# Patient Record
Sex: FEMALE | Race: WHITE | URBAN - METROPOLITAN AREA
[De-identification: names, ages, dates, MRNs, and addresses within clinical notes are randomized per-mention and may not be internally consistent; named-entity substitution may affect disease eponyms.]

---

## 2021-08-03 LAB — GBS, EXTERNAL RESULT: NEGATIVE

## 2021-09-03 ENCOUNTER — ANESTHESIA (OUTPATIENT)
Dept: LABOR AND DELIVERY | Age: 31
End: 2021-09-03
Payer: COMMERCIAL

## 2021-09-03 ENCOUNTER — HOSPITAL ENCOUNTER (INPATIENT)
Age: 31
LOS: 2 days | Discharge: HOME | End: 2021-09-05
Attending: OBSTETRICS & GYNECOLOGY | Admitting: OBSTETRICS & GYNECOLOGY
Payer: COMMERCIAL

## 2021-09-03 ENCOUNTER — ANESTHESIA EVENT (OUTPATIENT)
Dept: LABOR AND DELIVERY | Age: 31
End: 2021-09-03
Payer: COMMERCIAL

## 2021-09-03 VITALS
RESPIRATION RATE: 1 BRPM | SYSTOLIC BLOOD PRESSURE: 123 MMHG | OXYGEN SATURATION: 96 % | DIASTOLIC BLOOD PRESSURE: 64 MMHG

## 2021-09-03 PROBLEM — O36.60X0 LGA (LARGE FOR GESTATIONAL AGE) FETUS AFFECTING MANAGEMENT OF MOTHER: Status: ACTIVE | Noted: 2021-09-03

## 2021-09-03 PROBLEM — Z87.59 HISTORY OF ONE MISCARRIAGE: Status: ACTIVE | Noted: 2021-09-03

## 2021-09-03 PROBLEM — Z34.00 SUPERVISION OF NORMAL FIRST PREGNANCY: Status: ACTIVE | Noted: 2021-09-03

## 2021-09-03 LAB
ABO/RH: NORMAL
AMPHETAMINE SCREEN, URINE: NORMAL
ANTIBODY SCREEN: NORMAL
APTT: 30.4 SEC (ref 26.2–38.6)
BARBITURATE SCREEN URINE: NORMAL
BENZODIAZEPINE SCREEN, URINE: NORMAL
BUPRENORPHINE URINE: NORMAL
CANNABINOID SCREEN URINE: NORMAL
COCAINE METABOLITE SCREEN URINE: NORMAL
HCT VFR BLD CALC: 38.7 % (ref 36–48)
HEMOGLOBIN: 13.3 G/DL (ref 12–16)
INR BLD: 0.94 (ref 0.88–1.12)
Lab: NORMAL
MCH RBC QN AUTO: 31.9 PG (ref 26–34)
MCHC RBC AUTO-ENTMCNC: 34.4 G/DL (ref 31–36)
MCV RBC AUTO: 92.7 FL (ref 80–100)
METHADONE SCREEN, URINE: NORMAL
OPIATE SCREEN URINE: NORMAL
OXYCODONE URINE: NORMAL
PDW BLD-RTO: 14 % (ref 12.4–15.4)
PH UA: 6
PHENCYCLIDINE SCREEN URINE: NORMAL
PLATELET # BLD: 88 K/UL (ref 135–450)
PLATELET # BLD: NORMAL K/UL (ref 135–450)
PLATELET SLIDE REVIEW: ABNORMAL
PMV BLD AUTO: 10.8 FL (ref 5–10.5)
PROPOXYPHENE SCREEN: NORMAL
PROTHROMBIN TIME: 10.6 SEC (ref 9.9–12.7)
RBC # BLD: 4.18 M/UL (ref 4–5.2)
SARS-COV-2, NAAT: NOT DETECTED
SLIDE REVIEW: ABNORMAL
TOTAL SYPHILLIS IGG/IGM: NORMAL
WBC # BLD: 15.4 K/UL (ref 4–11)

## 2021-09-03 PROCEDURE — 86780 TREPONEMA PALLIDUM: CPT

## 2021-09-03 PROCEDURE — 6360000002 HC RX W HCPCS: Performed by: NURSE ANESTHETIST, CERTIFIED REGISTERED

## 2021-09-03 PROCEDURE — 2500000003 HC RX 250 WO HCPCS: Performed by: OBSTETRICS & GYNECOLOGY

## 2021-09-03 PROCEDURE — 7100000000 HC PACU RECOVERY - FIRST 15 MIN: Performed by: OBSTETRICS & GYNECOLOGY

## 2021-09-03 PROCEDURE — 3700000001 HC ADD 15 MINUTES (ANESTHESIA): Performed by: OBSTETRICS & GYNECOLOGY

## 2021-09-03 PROCEDURE — 85730 THROMBOPLASTIN TIME PARTIAL: CPT

## 2021-09-03 PROCEDURE — 7100000001 HC PACU RECOVERY - ADDTL 15 MIN: Performed by: OBSTETRICS & GYNECOLOGY

## 2021-09-03 PROCEDURE — 6360000002 HC RX W HCPCS: Performed by: OBSTETRICS & GYNECOLOGY

## 2021-09-03 PROCEDURE — 3700000000 HC ANESTHESIA ATTENDED CARE: Performed by: OBSTETRICS & GYNECOLOGY

## 2021-09-03 PROCEDURE — 2580000003 HC RX 258: Performed by: OBSTETRICS & GYNECOLOGY

## 2021-09-03 PROCEDURE — 6370000000 HC RX 637 (ALT 250 FOR IP): Performed by: OBSTETRICS & GYNECOLOGY

## 2021-09-03 PROCEDURE — 2709999900 HC NON-CHARGEABLE SUPPLY: Performed by: OBSTETRICS & GYNECOLOGY

## 2021-09-03 PROCEDURE — 87635 SARS-COV-2 COVID-19 AMP PRB: CPT

## 2021-09-03 PROCEDURE — 85610 PROTHROMBIN TIME: CPT

## 2021-09-03 PROCEDURE — 1220000000 HC SEMI PRIVATE OB R&B

## 2021-09-03 PROCEDURE — 80307 DRUG TEST PRSMV CHEM ANLYZR: CPT

## 2021-09-03 PROCEDURE — 85027 COMPLETE CBC AUTOMATED: CPT

## 2021-09-03 PROCEDURE — 86850 RBC ANTIBODY SCREEN: CPT

## 2021-09-03 PROCEDURE — 86900 BLOOD TYPING SEROLOGIC ABO: CPT

## 2021-09-03 PROCEDURE — 85049 AUTOMATED PLATELET COUNT: CPT

## 2021-09-03 PROCEDURE — 86901 BLOOD TYPING SEROLOGIC RH(D): CPT

## 2021-09-03 PROCEDURE — 2500000003 HC RX 250 WO HCPCS: Performed by: NURSE ANESTHETIST, CERTIFIED REGISTERED

## 2021-09-03 PROCEDURE — 3609079900 HC CESAREAN SECTION: Performed by: OBSTETRICS & GYNECOLOGY

## 2021-09-03 RX ORDER — ONDANSETRON 2 MG/ML
4 INJECTION INTRAMUSCULAR; INTRAVENOUS EVERY 6 HOURS PRN
Status: DISCONTINUED | OUTPATIENT
Start: 2021-09-03 | End: 2021-09-05 | Stop reason: HOSPADM

## 2021-09-03 RX ORDER — NALBUPHINE HCL 10 MG/ML
10 AMPUL (ML) INJECTION EVERY 4 HOURS PRN
Status: DISCONTINUED | OUTPATIENT
Start: 2021-09-03 | End: 2021-09-05 | Stop reason: HOSPADM

## 2021-09-03 RX ORDER — SODIUM CHLORIDE 9 MG/ML
25 INJECTION, SOLUTION INTRAVENOUS PRN
Status: DISCONTINUED | OUTPATIENT
Start: 2021-09-03 | End: 2021-09-03

## 2021-09-03 RX ORDER — MODIFIED LANOLIN
OINTMENT (GRAM) TOPICAL
Status: DISCONTINUED | OUTPATIENT
Start: 2021-09-03 | End: 2021-09-05 | Stop reason: HOSPADM

## 2021-09-03 RX ORDER — SODIUM CHLORIDE, SODIUM LACTATE, POTASSIUM CHLORIDE, AND CALCIUM CHLORIDE .6; .31; .03; .02 G/100ML; G/100ML; G/100ML; G/100ML
1000 INJECTION, SOLUTION INTRAVENOUS PRN
Status: DISCONTINUED | OUTPATIENT
Start: 2021-09-03 | End: 2021-09-03

## 2021-09-03 RX ORDER — SODIUM CHLORIDE, SODIUM LACTATE, POTASSIUM CHLORIDE, CALCIUM CHLORIDE 600; 310; 30; 20 MG/100ML; MG/100ML; MG/100ML; MG/100ML
INJECTION, SOLUTION INTRAVENOUS CONTINUOUS
Status: DISCONTINUED | OUTPATIENT
Start: 2021-09-03 | End: 2021-09-03

## 2021-09-03 RX ORDER — SODIUM CHLORIDE 0.9 % (FLUSH) 0.9 %
10 SYRINGE (ML) INJECTION EVERY 12 HOURS SCHEDULED
Status: DISCONTINUED | OUTPATIENT
Start: 2021-09-03 | End: 2021-09-03

## 2021-09-03 RX ORDER — DEXAMETHASONE SODIUM PHOSPHATE 4 MG/ML
INJECTION, SOLUTION INTRA-ARTICULAR; INTRALESIONAL; INTRAMUSCULAR; INTRAVENOUS; SOFT TISSUE PRN
Status: DISCONTINUED | OUTPATIENT
Start: 2021-09-03 | End: 2021-09-03 | Stop reason: SDUPTHER

## 2021-09-03 RX ORDER — SODIUM CHLORIDE, SODIUM LACTATE, POTASSIUM CHLORIDE, AND CALCIUM CHLORIDE .6; .31; .03; .02 G/100ML; G/100ML; G/100ML; G/100ML
1000 INJECTION, SOLUTION INTRAVENOUS ONCE
Status: COMPLETED | OUTPATIENT
Start: 2021-09-03 | End: 2021-09-03

## 2021-09-03 RX ORDER — SODIUM CHLORIDE, SODIUM LACTATE, POTASSIUM CHLORIDE, AND CALCIUM CHLORIDE .6; .31; .03; .02 G/100ML; G/100ML; G/100ML; G/100ML
500 INJECTION, SOLUTION INTRAVENOUS PRN
Status: DISCONTINUED | OUTPATIENT
Start: 2021-09-03 | End: 2021-09-03

## 2021-09-03 RX ORDER — SODIUM CHLORIDE 0.9 % (FLUSH) 0.9 %
5-40 SYRINGE (ML) INJECTION PRN
Status: DISCONTINUED | OUTPATIENT
Start: 2021-09-03 | End: 2021-09-03

## 2021-09-03 RX ORDER — METOCLOPRAMIDE HYDROCHLORIDE 5 MG/ML
10 INJECTION INTRAMUSCULAR; INTRAVENOUS ONCE
Status: COMPLETED | OUTPATIENT
Start: 2021-09-03 | End: 2021-09-03

## 2021-09-03 RX ORDER — PRENATAL 105/IRON/FOLIC AC/DHA 30-1.4-3
COMBINATION PACKAGE (EA) ORAL
COMMUNITY

## 2021-09-03 RX ORDER — DOCUSATE SODIUM 100 MG/1
100 CAPSULE, LIQUID FILLED ORAL 2 TIMES DAILY
Status: DISCONTINUED | OUTPATIENT
Start: 2021-09-03 | End: 2021-09-05 | Stop reason: HOSPADM

## 2021-09-03 RX ORDER — SODIUM CHLORIDE 0.9 % (FLUSH) 0.9 %
10 SYRINGE (ML) INJECTION PRN
Status: DISCONTINUED | OUTPATIENT
Start: 2021-09-03 | End: 2021-09-05 | Stop reason: HOSPADM

## 2021-09-03 RX ORDER — SODIUM CHLORIDE, SODIUM LACTATE, POTASSIUM CHLORIDE, CALCIUM CHLORIDE 600; 310; 30; 20 MG/100ML; MG/100ML; MG/100ML; MG/100ML
INJECTION, SOLUTION INTRAVENOUS CONTINUOUS
Status: DISCONTINUED | OUTPATIENT
Start: 2021-09-03 | End: 2021-09-05 | Stop reason: HOSPADM

## 2021-09-03 RX ORDER — SODIUM CHLORIDE 0.9 % (FLUSH) 0.9 %
10 SYRINGE (ML) INJECTION EVERY 12 HOURS SCHEDULED
Status: DISCONTINUED | OUTPATIENT
Start: 2021-09-03 | End: 2021-09-05 | Stop reason: HOSPADM

## 2021-09-03 RX ORDER — ONDANSETRON 2 MG/ML
4 INJECTION INTRAMUSCULAR; INTRAVENOUS EVERY 6 HOURS PRN
Status: DISCONTINUED | OUTPATIENT
Start: 2021-09-03 | End: 2021-09-03

## 2021-09-03 RX ORDER — SODIUM CHLORIDE 9 MG/ML
25 INJECTION, SOLUTION INTRAVENOUS PRN
Status: DISCONTINUED | OUTPATIENT
Start: 2021-09-03 | End: 2021-09-05 | Stop reason: HOSPADM

## 2021-09-03 RX ORDER — ACETAMINOPHEN 500 MG
1000 TABLET ORAL ONCE
Status: COMPLETED | OUTPATIENT
Start: 2021-09-03 | End: 2021-09-03

## 2021-09-03 RX ORDER — SODIUM CHLORIDE 0.9 % (FLUSH) 0.9 %
5-40 SYRINGE (ML) INJECTION EVERY 12 HOURS SCHEDULED
Status: DISCONTINUED | OUTPATIENT
Start: 2021-09-03 | End: 2021-09-03

## 2021-09-03 RX ORDER — OXYCODONE HYDROCHLORIDE 5 MG/1
5 TABLET ORAL EVERY 4 HOURS PRN
Status: DISCONTINUED | OUTPATIENT
Start: 2021-09-03 | End: 2021-09-05 | Stop reason: HOSPADM

## 2021-09-03 RX ORDER — DOCUSATE SODIUM 100 MG/1
100 CAPSULE, LIQUID FILLED ORAL 2 TIMES DAILY
Status: DISCONTINUED | OUTPATIENT
Start: 2021-09-03 | End: 2021-09-03

## 2021-09-03 RX ORDER — IBUPROFEN 800 MG/1
800 TABLET ORAL EVERY 8 HOURS
Status: DISCONTINUED | OUTPATIENT
Start: 2021-09-03 | End: 2021-09-05 | Stop reason: HOSPADM

## 2021-09-03 RX ORDER — MORPHINE SULFATE 10 MG/ML
INJECTION, SOLUTION INTRAMUSCULAR; INTRAVENOUS PRN
Status: DISCONTINUED | OUTPATIENT
Start: 2021-09-03 | End: 2021-09-03 | Stop reason: SDUPTHER

## 2021-09-03 RX ORDER — TRISODIUM CITRATE DIHYDRATE AND CITRIC ACID MONOHYDRATE 500; 334 MG/5ML; MG/5ML
30 SOLUTION ORAL ONCE
Status: COMPLETED | OUTPATIENT
Start: 2021-09-03 | End: 2021-09-03

## 2021-09-03 RX ORDER — OXYCODONE HYDROCHLORIDE 5 MG/1
10 TABLET ORAL EVERY 4 HOURS PRN
Status: DISCONTINUED | OUTPATIENT
Start: 2021-09-03 | End: 2021-09-05 | Stop reason: HOSPADM

## 2021-09-03 RX ORDER — KETOROLAC TROMETHAMINE 30 MG/ML
INJECTION, SOLUTION INTRAMUSCULAR; INTRAVENOUS PRN
Status: DISCONTINUED | OUTPATIENT
Start: 2021-09-03 | End: 2021-09-03 | Stop reason: SDUPTHER

## 2021-09-03 RX ORDER — KETOROLAC TROMETHAMINE 30 MG/ML
30 INJECTION, SOLUTION INTRAMUSCULAR; INTRAVENOUS EVERY 8 HOURS
Status: DISCONTINUED | OUTPATIENT
Start: 2021-09-03 | End: 2021-09-04 | Stop reason: ALTCHOICE

## 2021-09-03 RX ORDER — FENTANYL CITRATE 50 UG/ML
INJECTION, SOLUTION INTRAMUSCULAR; INTRAVENOUS PRN
Status: DISCONTINUED | OUTPATIENT
Start: 2021-09-03 | End: 2021-09-03 | Stop reason: SDUPTHER

## 2021-09-03 RX ORDER — ACETAMINOPHEN 500 MG
1000 TABLET ORAL EVERY 8 HOURS
Status: DISCONTINUED | OUTPATIENT
Start: 2021-09-03 | End: 2021-09-05 | Stop reason: HOSPADM

## 2021-09-03 RX ORDER — LIDOCAINE HYDROCHLORIDE 10 MG/ML
INJECTION, SOLUTION INFILTRATION; PERINEURAL PRN
Status: DISCONTINUED | OUTPATIENT
Start: 2021-09-03 | End: 2021-09-03 | Stop reason: SDUPTHER

## 2021-09-03 RX ORDER — ONDANSETRON 2 MG/ML
INJECTION INTRAMUSCULAR; INTRAVENOUS PRN
Status: DISCONTINUED | OUTPATIENT
Start: 2021-09-03 | End: 2021-09-03 | Stop reason: SDUPTHER

## 2021-09-03 RX ORDER — BUPIVACAINE HYDROCHLORIDE 7.5 MG/ML
INJECTION, SOLUTION INTRASPINAL PRN
Status: DISCONTINUED | OUTPATIENT
Start: 2021-09-03 | End: 2021-09-03 | Stop reason: SDUPTHER

## 2021-09-03 RX ORDER — NALOXONE HYDROCHLORIDE 0.4 MG/ML
0.4 INJECTION, SOLUTION INTRAMUSCULAR; INTRAVENOUS; SUBCUTANEOUS PRN
Status: DISCONTINUED | OUTPATIENT
Start: 2021-09-03 | End: 2021-09-05 | Stop reason: HOSPADM

## 2021-09-03 RX ORDER — SODIUM CHLORIDE 0.9 % (FLUSH) 0.9 %
10 SYRINGE (ML) INJECTION PRN
Status: DISCONTINUED | OUTPATIENT
Start: 2021-09-03 | End: 2021-09-03

## 2021-09-03 RX ADMIN — KETOROLAC TROMETHAMINE 30 MG: 30 INJECTION, SOLUTION INTRAMUSCULAR; INTRAVENOUS at 13:12

## 2021-09-03 RX ADMIN — SODIUM CHLORIDE, POTASSIUM CHLORIDE, SODIUM LACTATE AND CALCIUM CHLORIDE: 600; 310; 30; 20 INJECTION, SOLUTION INTRAVENOUS at 18:27

## 2021-09-03 RX ADMIN — FENTANYL CITRATE 15 MCG: 50 INJECTION, SOLUTION INTRAMUSCULAR; INTRAVENOUS at 12:49

## 2021-09-03 RX ADMIN — PHENYLEPHRINE HYDROCHLORIDE 100 MCG: 10 INJECTION INTRAVENOUS at 12:58

## 2021-09-03 RX ADMIN — DOCUSATE SODIUM 100 MG: 100 CAPSULE, LIQUID FILLED ORAL at 21:06

## 2021-09-03 RX ADMIN — CEFAZOLIN SODIUM 2000 MG: 10 INJECTION, POWDER, FOR SOLUTION INTRAVENOUS at 12:38

## 2021-09-03 RX ADMIN — KETOROLAC TROMETHAMINE 30 MG: 30 INJECTION, SOLUTION INTRAMUSCULAR at 21:05

## 2021-09-03 RX ADMIN — SODIUM CITRATE AND CITRIC ACID MONOHYDRATE 30 ML: 500; 334 SOLUTION ORAL at 11:20

## 2021-09-03 RX ADMIN — ACETAMINOPHEN 1000 MG: 500 TABLET ORAL at 11:20

## 2021-09-03 RX ADMIN — MORPHINE SULFATE 0.2 MG: 10 INJECTION, SOLUTION INTRAMUSCULAR; INTRAVENOUS at 12:49

## 2021-09-03 RX ADMIN — DEXAMETHASONE SODIUM PHOSPHATE 8 MG: 4 INJECTION, SOLUTION INTRAMUSCULAR; INTRAVENOUS at 13:12

## 2021-09-03 RX ADMIN — SODIUM CHLORIDE, POTASSIUM CHLORIDE, SODIUM LACTATE AND CALCIUM CHLORIDE 1000 ML: 600; 310; 30; 20 INJECTION, SOLUTION INTRAVENOUS at 11:47

## 2021-09-03 RX ADMIN — Medication 100 ML: at 13:11

## 2021-09-03 RX ADMIN — SODIUM CHLORIDE, POTASSIUM CHLORIDE, SODIUM LACTATE AND CALCIUM CHLORIDE: 600; 310; 30; 20 INJECTION, SOLUTION INTRAVENOUS at 04:46

## 2021-09-03 RX ADMIN — FAMOTIDINE 20 MG: 10 INJECTION, SOLUTION INTRAVENOUS at 11:20

## 2021-09-03 RX ADMIN — LIDOCAINE HYDROCHLORIDE 3 ML: 10 INJECTION, SOLUTION INFILTRATION; PERINEURAL at 12:48

## 2021-09-03 RX ADMIN — ACETAMINOPHEN 1000 MG: 500 TABLET ORAL at 19:08

## 2021-09-03 RX ADMIN — ONDANSETRON 4 MG: 2 INJECTION INTRAMUSCULAR; INTRAVENOUS at 12:33

## 2021-09-03 RX ADMIN — BUPIVACAINE HYDROCHLORIDE 2 ML: 7.5 INJECTION, SOLUTION INTRASPINAL at 12:49

## 2021-09-03 RX ADMIN — Medication 150 ML: at 13:38

## 2021-09-03 RX ADMIN — METOCLOPRAMIDE 10 MG: 5 INJECTION, SOLUTION INTRAMUSCULAR; INTRAVENOUS at 11:19

## 2021-09-03 RX ADMIN — FENTANYL CITRATE 85 MCG: 50 INJECTION, SOLUTION INTRAMUSCULAR; INTRAVENOUS at 13:21

## 2021-09-03 ASSESSMENT — PULMONARY FUNCTION TESTS
PIF_VALUE: 0
PIF_VALUE: 1
PIF_VALUE: 0

## 2021-09-03 ASSESSMENT — PAIN SCALES - GENERAL
PAINLEVEL_OUTOF10: 0

## 2021-09-03 ASSESSMENT — LIFESTYLE VARIABLES: SMOKING_STATUS: 0

## 2021-09-03 NOTE — ANESTHESIA PROCEDURE NOTES
Spinal Block    Patient location during procedure: OB  Start time: 9/3/2021 12:46 PM  End time: 9/3/2021 12:51 PM  Reason for block: primary anesthetic  Staffing  Performed: resident/CRNA   Anesthesiologist: Gill Herron MD  Resident/CRNA: DAMEON Ornelas CRNA  Preanesthetic Checklist  Completed: patient identified, IV checked, site marked, risks and benefits discussed, surgical consent, monitors and equipment checked, pre-op evaluation, timeout performed, anesthesia consent given, oxygen available and patient being monitored  Spinal Block  Patient position: sitting  Prep: ChloraPrep  Patient monitoring: continuous pulse ox and frequent blood pressure checks  Approach: midline  Location: L3/L4  Provider prep: mask and sterile gloves  Local infiltration: lidocaine  Dose: 0.2  Agent: bupivacaine  Adjuvant: duramorph  Dose: 2  Dose: 2  Needle  Needle type: pencil-tip   Needle gauge: 25 G  Needle length: 3.5 in  Assessment  Sensory level: T4  Swirl obtained: Yes  CSF: clear  Attempts: 1  Hemodynamics: stable

## 2021-09-03 NOTE — OP NOTE
Obstetrics  Section Operative Report           Pre-operative Diagnosis:  cephalopelvic disproportion and suspected fetal macrosomia    Post-operative Diagnosis:  Same    Procedure:  primary low transverse  section    Surgeon:        Assistant(s):       Anesthesia:  Spinal anesthesia    Indications: This is a 35 y.o.   OB History        2    Para        Term                AB   1    Living           SAB   1    TAB        Ectopic        Molar        Multiple        Live Births                  with  Estimated Date of Delivery: 21 presents for several days of contractions with little or no dilation of the cervix. She is allowed to labor for another several hours here and continues with no dilation and less descent of the fetal station. After a lengthy time of the patient and her  considering all options she elects to have this primary  delivery. TECHNIQUE:  The patient was brought to the operating room and following the placement of adequate Spinal Anesthesia, the patient was placed in dorsal supine position and wedged to the left. A Barr catheter was placed in the patient's bladder and Darwin pumps were placed on her lower extremities for DVT prophylaxis. Following this, the patient was prepped and draped in the usual sterile manner for a  section. A Pfannenstiel incision was made in the skin with a knife and extended down to the fascia with sharp and blunt dissection. The fascia was then incised in the midline with the knife. The fascial incision was extended bilaterally using curved White scissors. Fascia was then bluntly and sharply dissected off underlying muscles and then the muscle was sharply and bluntly dissected in the midline. The peritoneum was entered in it's most cephalad area and the peritoneal layer was noted to be free of any signs of bowel or bladder adhesions.   The inferior leaf of the peritoneum is transilluminated as the incision was extended superiorly and inferiorly, taking care to avoid the superior dome of the bladder. The bladder blade was inserted and the bladder flap was formed sharply and then bluntly dissected from the lower uterine segment. The bladder blade was removed and re-inserted allowing for an inferior retraction of the bladder. A low transverse uterine incision was then made with a scalpel and extended bilaterally with digital traction. Upon entering the uterine cavity, a moderate amount of Meconium stained fluid was noted. This was noted to be thin until the delivery of the baby with a trailing large amount of thick and particulate material.  The infant was delivered atraumatically from a cephalic presentation, albeit directly OP. Upon delivery of the infant, nose and mouth were bulb suctioned. After an appropriate delay, the cord was clamped and cut and the infant was handed off the sterile table in a routine manner. Cord gases were not collected. Cord blood was collected. The placenta was expressed and delivers without complications and the uterus was cleansed of any remaining products of conception. The uterus is then exteriorized, wrapped in a wet laparotomy sponge and wiped dry with a dry lap. The uterine incision was then closed in two layers using 0-Monocryl suture. The first layer was a running locking stitch and the second layer was a running imbricating stitch. The  incision was re-examined and noted to be hemostatic and the uterus is placed back into the abdominal cavity. Clots were removed. The peritoneum was closed using a running suture of 3-0 Vicryl. The fascia was closed using a two running sutures of 0-Vicryl beginning laterally and meeting in the midline. The subcutaneous layer was reapproximated with interrupted sutures of 3-0 plain in an inverted mattress fashion. Skin was re-approximated with running 4-0 Monocryl subcuticular stitch.   All sponge, instrument and needle counts are correct at the end of the procedure. The patient tolerated the procedure well and went to the recovery room in stable condition. The wound is dressed with Telfa and Tegaderm. Antibiotics: Ancef 2 grams IV    Tubes, uterus, ovaries:  Normal appearing uterus tubes, and ovaries in the post partum state withoutsignificant findings.       Estimated blood loss:  800ml    Specimens:  none    Complications:  none    Baby:     Information for the patient's :  Jenny Dura Boy Ravinder Gutierres [[id number]]   APGAR One: N/A     Information for the patient's :  Uday De La Cruz [[id number]]   APGAR Five: N/A     Information for the patient's :  Uday De La Cruz [[id number]]   Birth Weight: N/A       Condition:    Infant stable to recovery with mother  Mother stable, transfer to labor & delivery room 9    No Fuentes MD  21

## 2021-09-03 NOTE — ANESTHESIA PRE PROCEDURE
Department of Anesthesiology  Preprocedure Note       Name:  John Crowell   Age:  35 y.o.  :  1990                                          MRN:  [id number]         Date:  9/3/2021      Surgeon: * No surgeons listed *    Procedure: * No procedures listed *    Medications prior to admission:   Prior to Admission medications    Medication Sig Start Date End Date Taking? Authorizing Provider   Hrtkvo-LwQduu-FJ-DHA w/o Vit A (PRENA 1 TRUE) 30-1.4 & 300 MG MISC Take by mouth   Yes Historical Provider, MD       Current medications:    Current Facility-Administered Medications   Medication Dose Route Frequency Provider Last Rate Last Admin    lactated ringers infusion   IntraVENous Continuous Frieda Ramírez  mL/hr at 21 0615 Rate Verify at 21 0615    lactated ringers bolus  500 mL IntraVENous PRN Frieda Ramírez MD        Or    lactated ringers bolus  1,000 mL IntraVENous PRN Frieda Ramírez MD        sodium chloride flush 0.9 % injection 5-40 mL  5-40 mL IntraVENous 2 times per day Frieda Ramírez MD        sodium chloride flush 0.9 % injection 5-40 mL  5-40 mL IntraVENous PRN Frieda Ramírez MD        0.9 % sodium chloride infusion  25 mL IntraVENous PRN Frieda Ramírez MD        oxytocin (PITOCIN) 30 units in 500 mL infusion  87.3 reinier-units/min IntraVENous Continuous PRN Frieda Ramírez MD        And    oxytocin (PITOCIN) 10 unit bolus from the bag  10 Units IntraVENous PRN Frieda Ramírez MD        [facility]dansetron [facility]) injection 4 mg  4 mg IntraVENous Q6H PRN Frieda Ramírez MD        docusate sodium (COLACE) capsule 100 mg  100 mg Oral BID Frieda Ramírez MD           Allergies:  No Known Allergies    Problem List:  There is no problem list on file for this patient. Past Medical History:  History reviewed. No pertinent past medical history. Past Surgical History:  History reviewed. No pertinent surgical history.     Social History:    Social History     Tobacco Use  Smoking status: Never Smoker    Smokeless tobacco: Never Used   Substance Use Topics    Alcohol use: Never                                Counseling given: Not Answered      Vital Signs (Current):   Vitals:    09/03/21 0449 09/03/21 0542 09/03/21 0615 09/03/21 0646   BP: (!) 159/70 134/71  133/78   Pulse: 118 88  86   Resp: 18 18 18 18   Temp: 36.6 °C (97.8 °F)      Weight:       Height:                                                  BP Readings from Last 3 Encounters:   09/03/21 133/78       NPO Status:                                                                                 BMI:   Wt Readings from Last 3 Encounters:   09/03/21 261 lb (118.4 kg)     Body mass index is 38.54 kg/m². CBC:   Lab Results   Component Value Date    WBC 15.4 09/03/2021    RBC 4.18 09/03/2021    HGB 13.3 09/03/2021    HCT 38.7 09/03/2021    MCV 92.7 09/03/2021    RDW 14.0 09/03/2021    PLT 88 09/03/2021       CMP: No results found for: NA, K, CL, CO2, BUN, CREATININE, GFRAA, AGRATIO, LABGLOM, GLUCOSE, PROT, CALCIUM, BILITOT, ALKPHOS, AST, ALT    POC Tests: No results for input(s): POCGLU, POCNA, POCK, POCCL, POCBUN, POCHEMO, POCHCT in the last 72 hours.     Coags: No results found for: PROTIME, INR, APTT    HCG (If Applicable): No results found for: PREGTESTUR, PREGSERUM, HCG, HCGQUANT     ABGs: No results found for: PHART, PO2ART, JNV7ZQD, VER0SXA, BEART, V6RWCLJB     Type & Screen (If Applicable):  No results found for: LABABO, LABRH    Drug/Infectious Status (If Applicable):  No results found for: HIV, HEPCAB    COVID-19 Screening (If Applicable):   Lab Results   Component Value Date    COVID19 Not Detected 09/03/2021           Anesthesia Evaluation  Patient summary reviewed and Nursing notes reviewed no history of anesthetic complications:   Airway: Mallampati: III  TM distance: >3 FB   Neck ROM: full  Mouth opening: > = 3 FB Dental: normal exam         Pulmonary:Negative Pulmonary ROS and normal exam        (-) not a current smoker                           Cardiovascular:Negative CV ROS             Beta Blocker:  Not on Beta Blocker         Neuro/Psych:   Negative Neuro/Psych ROS              GI/Hepatic/Renal:   (+) GERD: no interval change,           Endo/Other: Negative Endo/Other ROS                    Abdominal:             Vascular: negative vascular ROS. Other Findings:             Anesthesia Plan      spinal     ASA 2     (Height: 5' 9\" (1.753 m), Weight: 261 lb (118.4 kg), BP: 133/78  Lab Results       Component                Value               Date                       WBC                      15.4 (H)            09/03/2021                 HGB                      13.3                09/03/2021                 HCT                      38.7                09/03/2021                 MCV                      92.7                09/03/2021                 PLT                      88 (L)              09/03/2021    Platlets redrawn and now 130          Discussed spinal procedure and risks including but not limited to infection, changes in VS, n/v, severe headache, paresthesia, intravascular injection, rare temporary or permanent injury and failed block with GETA back up. Discussed the addition of duramorph to the spinal.  Patient understands the side effects of duramorph and agrees to its utilization.)        Anesthetic plan and risks discussed with patient. Use of blood products discussed with patient whom consented to blood products. Plan discussed with CRNA.     Attending anesthesiologist reviewed and agrees with Preprocedure content          DAMEON Pang - CRNA   9/3/2021

## 2021-09-03 NOTE — H&P
Obstetrics and Gynecology   Obstetrics History and Physical        CHIEF COMPLAINT:  contractions    HISTORY OF PRESENT ILLNESS:      The patient is a 35 y.o. female at 39w6d. OB History        2    Para        Term                AB   1    Living           SAB   1    TAB        Ectopic        Molar        Multiple        Live Births                Patient presents with a chief complaint as above and is being admitted for latent labor. The patient does express a strong desire for natural childbirth and she is consented for this choice at each office visit. Estimated Due Date: Estimated Date of Delivery: 21    PRENATAL CARE:    Complicated by: suspected macrosomia  Patient Active Problem List:     LGA (large for gestational age) fetus affecting management of mother     Supervision of normal first pregnancy     History of one miscarriage        PAST OB HISTORY:  OB History        2    Para        Term                AB   1    Living           SAB   1    TAB        Ectopic        Molar        Multiple        Live Births                    Past Medical History:        Diagnosis Date    Thrombocytopenia ([facility] [address]      Past Surgical History:    History reviewed. No pertinent surgical history. Allergies:  Patient has no known allergies.     Social History:    Social History     Socioeconomic History    Marital status:      Spouse name: Not on file    Number of children: Not on file    Years of education: Not on file    Highest education level: Not on file   Occupational History    Not on file   Tobacco Use    Smoking status: Never Smoker    Smokeless tobacco: Never Used   Vaping Use    Vaping Use: Never used   Substance and Sexual Activity    Alcohol use: Never    Drug use: Never    Sexual activity: Yes     Partners: Male   Other Topics Concern    Not on file   Social History Narrative    Not on file     Social Determinants of Health     Financial Resource Strain:  Difficulty of Paying Living Expenses:    Food Insecurity:     Worried About Running Out of Food in the Last Year:     Ran Out of Food in the Last Year:    Transportation Needs:     Lack of Transportation (Medical):  Lack of Transportation (Non-Medical):    Physical Activity:     Days of Exercise per Week:     Minutes of Exercise per Session:    Stress:     Feeling of Stress :    Social Connections:     Frequency of Communication with Friends and Family:     Frequency of Social Gatherings with Friends and Family:     Attends Temple Services:     Active Member of Clubs or Organizations:     Attends Club or Organization Meetings:     Marital Status:    Intimate Partner Violence:     Fear of Current or Ex-Partner:     Emotionally Abused:     Physically Abused:     Sexually Abused:      Family History:   History reviewed. No pertinent family history. Medications Prior to Admission:  Medications Prior to Admission: Qfynyn-XsYjir-UF-DHA w/o Vit A (PRENA 1 TRUE) 30-1.4 & 300 MG MISC, Take by mouth    REVIEW OF SYSTEMS:  Denies any of the following:  fever/chills, headache, visual changes, chest pain, shortness of breath, nausea/vomiting/diarrhea, bruising and rash    PHYSICAL EXAM:  Vitals:    09/03/21 0449 09/03/21 0542 09/03/21 0615 09/03/21 0646   BP: (!) 159/70 134/71  133/78   Pulse: 118 88  86   Resp: 18 18 18 18   Temp: 97.8 °F (36.6 °C)      Weight:       Height:         General appearance:  awake, alert, cooperative, no apparent distress, and appears stated age  Neurologic:  Awake, alert, oriented to name, place and time. Lungs:  No increased work of breathing, good air exchange  Abdomen:  Soft, non tender, gravid, consistent with her gestational age,   EFW:  by external exam was  appropriate for gestational age  Fetal heart rate:  Reassuring. FETAL SURVEILLANCE TESTING SUMMARY  INDICATIONS:  Early labor evaluation.   OBJECTIVE RESULTS:  Fetal heart variability: moderate  Fetal Heart Rate decelerations: none  Fetal Heart Rate accelerations: yes  Baseline FHR: 140 per minute  Uterine contractions: regular, every 2-3 minutes  Fetal surveillance: reassuring, Category 1  Pelvis:  Adequate pelvis  Fetal position: Cephalic    CERVIX: At admission in triage:    Dilation (cm): 1           Station: -1        Membranes:  Intact    Labs:   CBC:   Lab Results   Component Value Date    WBC 15.4 2021    RBC 4.18 2021    HGB 13.3 2021    HCT 38.7 2021    MCV 92.7 2021    MCH 31.9 2021    MCHC 34.4 2021    RDW 14.0 2021    PLT see below 2021    MPV 10.8 2021       ASSESSMENT AND PLAN:    Labor: Admit, anticipate normal delivery, routine labor orders  Fetus: Reassuring  GBS: No  Other: Supportive care for this patient at term with desire for natural childbirth and with mom and baby doing well at this time. Long discussion today regarding all options for this prolonged course of contractions that have not been productive for cervical change and with EFW of greater than 5 KG. Patient and her  are allow to discuss these options and she elects to have a primary  section for this suspected macrosomic infant. Prior to our procedure, this patient is given all risk versus benefits of the planned procedure and she voices an understanding of this planned procedure, asks appropriate questions that are all answered to her satisfaction demonstrating a complete understanding of this procedure and she then elects to proceed.     Lanora Cooks, MD

## 2021-09-03 NOTE — LACTATION NOTE
This note was copied from a baby's chart. Lactation Progress Note      Data:  LC to bedside. Action:  MOB stated infant has a few good feedings. Discussed normal infant feeding patterns in the first 24 hours, hunger cues, skin to skin, and latching. Infant asleep at this time. MOB stated infant fed last at 14:25. Discussed putting infant skin to skin if infant does not wake for feeding in the next 30 min. MOB stated she attempted to feed infant when he woke up but he had the hiccups and sucked for a few sucks then went back to sleep. Response:  No other questions at this time. MOB will call if she needs assistance for next feeding.

## 2021-09-04 LAB
HCT VFR BLD CALC: 32.5 % (ref 36–48)
HEMOGLOBIN: 11.1 G/DL (ref 12–16)
MCH RBC QN AUTO: 31.6 PG (ref 26–34)
MCHC RBC AUTO-ENTMCNC: 34.1 G/DL (ref 31–36)
MCV RBC AUTO: 92.7 FL (ref 80–100)
PDW BLD-RTO: 14.1 % (ref 12.4–15.4)
PLATELET # BLD: 87 K/UL (ref 135–450)
PMV BLD AUTO: 11.1 FL (ref 5–10.5)
RBC # BLD: 3.51 M/UL (ref 4–5.2)
WBC # BLD: 12.8 K/UL (ref 4–11)

## 2021-09-04 PROCEDURE — 85027 COMPLETE CBC AUTOMATED: CPT

## 2021-09-04 PROCEDURE — 6360000002 HC RX W HCPCS: Performed by: OBSTETRICS & GYNECOLOGY

## 2021-09-04 PROCEDURE — 2580000003 HC RX 258: Performed by: OBSTETRICS & GYNECOLOGY

## 2021-09-04 PROCEDURE — 1220000000 HC SEMI PRIVATE OB R&B

## 2021-09-04 PROCEDURE — 6370000000 HC RX 637 (ALT 250 FOR IP): Performed by: OBSTETRICS & GYNECOLOGY

## 2021-09-04 RX ADMIN — ACETAMINOPHEN 1000 MG: 500 TABLET ORAL at 15:58

## 2021-09-04 RX ADMIN — SODIUM CHLORIDE, POTASSIUM CHLORIDE, SODIUM LACTATE AND CALCIUM CHLORIDE: 600; 310; 30; 20 INJECTION, SOLUTION INTRAVENOUS at 00:54

## 2021-09-04 RX ADMIN — IBUPROFEN 800 MG: 800 TABLET, FILM COATED ORAL at 14:03

## 2021-09-04 RX ADMIN — ACETAMINOPHEN 1000 MG: 500 TABLET ORAL at 05:42

## 2021-09-04 RX ADMIN — KETOROLAC TROMETHAMINE 30 MG: 30 INJECTION, SOLUTION INTRAMUSCULAR at 05:42

## 2021-09-04 RX ADMIN — DOCUSATE SODIUM 100 MG: 100 CAPSULE, LIQUID FILLED ORAL at 08:19

## 2021-09-04 RX ADMIN — IBUPROFEN 800 MG: 800 TABLET, FILM COATED ORAL at 22:51

## 2021-09-04 RX ADMIN — DOCUSATE SODIUM 100 MG: 100 CAPSULE, LIQUID FILLED ORAL at 20:39

## 2021-09-04 RX ADMIN — Medication 10 ML: at 08:19

## 2021-09-04 RX ADMIN — OXYCODONE 5 MG: 5 TABLET ORAL at 10:54

## 2021-09-04 RX ADMIN — OXYCODONE 5 MG: 5 TABLET ORAL at 20:39

## 2021-09-04 ASSESSMENT — PAIN SCALES - GENERAL
PAINLEVEL_OUTOF10: 4
PAINLEVEL_OUTOF10: 5
PAINLEVEL_OUTOF10: 4

## 2021-09-04 NOTE — ANESTHESIA POSTPROCEDURE EVALUATION
Department of Anesthesiology  Postprocedure Note    Patient: Anabella Snider  MRN: [id number]  YOB: 1990  Date of evaluation: 2021  Time:  2:27 PM     Procedure Summary     Date: 21 Room / Location: [facility] OR [address] [place] [address]    Anesthesia Start: 3385 Anesthesia Stop: 8585    Procedures:        SECTION (N/A Abdomen)      Labor Analgesia Diagnosis:       Fetal macrosomia, unspecified trimester, fetus 1      (suspected macrosomia)    Surgeons: Noe Cagle MD Responsible Provider: Ollie Uribe MD    Anesthesia Type: spinal ASA Status: 2          Anesthesia Type: spinal    Dillon Phase I: Dillon Score: 9    Dillon Phase II:      Last vitals: Reviewed and per EMR flowsheets. Anesthesia Post Evaluation    Patient location during evaluation: bedside  Patient participation: complete - patient participated  Level of consciousness: awake and alert  Pain score: 2  Airway patency: patent  Nausea & Vomiting: no nausea and no vomiting  Complications: no  Cardiovascular status: hemodynamically stable  Respiratory status: spontaneous ventilation and room air  Hydration status: stable  Comments: Patient s/p Spinal for C/S. Pt denies residual numbness post block. Patient is ambulating and voiding without difficulty. Patient denies back pain, headache, paresthesias, pruritus or n/v.  Spinal site is free of signs of infection.

## 2021-09-05 VITALS
HEIGHT: 69 IN | DIASTOLIC BLOOD PRESSURE: 78 MMHG | BODY MASS INDEX: 38.66 KG/M2 | SYSTOLIC BLOOD PRESSURE: 122 MMHG | WEIGHT: 261 LBS | TEMPERATURE: 97.6 F | OXYGEN SATURATION: 100 % | HEART RATE: 98 BPM | RESPIRATION RATE: 18 BRPM

## 2021-09-05 PROBLEM — O36.60X0 LGA (LARGE FOR GESTATIONAL AGE) FETUS AFFECTING MANAGEMENT OF MOTHER: Status: RESOLVED | Noted: 2021-09-03 | Resolved: 2021-09-05

## 2021-09-05 PROBLEM — Z87.59 HISTORY OF ONE MISCARRIAGE: Status: RESOLVED | Noted: 2021-09-03 | Resolved: 2021-09-05

## 2021-09-05 PROBLEM — Z34.00 SUPERVISION OF NORMAL FIRST PREGNANCY: Status: RESOLVED | Noted: 2021-09-03 | Resolved: 2021-09-05

## 2021-09-05 PROCEDURE — 6370000000 HC RX 637 (ALT 250 FOR IP): Performed by: OBSTETRICS & GYNECOLOGY

## 2021-09-05 RX ADMIN — IBUPROFEN 800 MG: 800 TABLET, FILM COATED ORAL at 14:23

## 2021-09-05 RX ADMIN — ACETAMINOPHEN 1000 MG: 500 TABLET ORAL at 08:15

## 2021-09-05 RX ADMIN — IBUPROFEN 800 MG: 800 TABLET, FILM COATED ORAL at 06:31

## 2021-09-05 RX ADMIN — OXYCODONE 5 MG: 5 TABLET ORAL at 12:37

## 2021-09-05 RX ADMIN — ACETAMINOPHEN 1000 MG: 500 TABLET ORAL at 00:13

## 2021-09-05 RX ADMIN — DOCUSATE SODIUM 100 MG: 100 CAPSULE, LIQUID FILLED ORAL at 08:15

## 2021-09-05 ASSESSMENT — PAIN SCALES - GENERAL
PAINLEVEL_OUTOF10: 4
PAINLEVEL_OUTOF10: 5
PAINLEVEL_OUTOF10: 5
PAINLEVEL_OUTOF10: 2
PAINLEVEL_OUTOF10: 5
PAINLEVEL_OUTOF10: 6

## 2021-09-05 ASSESSMENT — PAIN DESCRIPTION - PAIN TYPE: TYPE: ACUTE PAIN

## 2021-09-05 ASSESSMENT — PAIN DESCRIPTION - PROGRESSION: CLINICAL_PROGRESSION: GRADUALLY WORSENING

## 2021-09-05 ASSESSMENT — PAIN DESCRIPTION - FREQUENCY: FREQUENCY: INTERMITTENT

## 2021-09-05 ASSESSMENT — PAIN - FUNCTIONAL ASSESSMENT: PAIN_FUNCTIONAL_ASSESSMENT: ACTIVITIES ARE NOT PREVENTED

## 2021-09-05 ASSESSMENT — PAIN DESCRIPTION - DESCRIPTORS: DESCRIPTORS: SORE

## 2021-09-05 ASSESSMENT — PAIN DESCRIPTION - LOCATION: LOCATION: BACK

## 2021-09-05 NOTE — DISCHARGE SUMMARY
Obstetrical Discharge Form    Gestational Age: 39w6d    Antepartum complications: suspected macrosomia of infant and with failure to progress    Date of Delivery:    Information for the patient's :  Alphonse Angelucci Boy Arvin Holly [[id number]]   @[facility]@      Information for the patient's :  Donya Watson [[id number]]   @[facility]@       Type of Delivery:  for failure to progress    Delivered By: Yari Wilkes MD    Baby:      Information for the patient's :  Jason Ceballos [[id number]]   APGAR One: 9     Information for the patient's :  Alphonse Angelucci Boy Arvin Holly [[id number]]   APGAR Five: 9     Information for the patient's :  Alphonse Angelucci Boy Arvin Holly [[id number]]   Birth Weight: 10 lb 0.7 oz (4.555 kg)       Anesthesia: Spinal    Intrapartum complications: None    Postpartum complications: none    Discharge Medication:    Perry Tsang   Home Medication Instructions [id number]    Printed on:21 1231   Medication Information                      Xlcwmk-AwItjc-VV-DHA w/o Vit A (PRENA 1 TRUE) 30-1.4 & 300 MG MISC  Take by mouth                  Discharge Condition:  good    Discharge Date: 2021    PLAN:  Follow up in 6 weeks for routine PP visit  All questions answered  D/C summary is written for D/C planning purposes, any delay in discharge from ordered D/C date due may be due to  factors and may be dependant on pediatric orders for  discharge planning.     Yari Wilkes MD

## 2024-01-11 LAB
HEP B, EXTERNAL RESULT: NEGATIVE
HEPATITIS C ANTIBODY, EXTERNAL RESULT: NON REACTIVE
HIV, EXTERNAL RESULT: NON REACTIVE
RPR, EXTERNAL RESULT: NON REACTIVE
RUBELLA TITER, EXTERNAL RESULT: NORMAL

## 2024-07-03 LAB
C. TRACHOMATIS, EXTERNAL RESULT: NEGATIVE
GBS, EXTERNAL RESULT: NEGATIVE
N. GONORRHOEAE, EXTERNAL RESULT: NEGATIVE

## 2024-07-09 ENCOUNTER — ANESTHESIA EVENT (OUTPATIENT)
Dept: LABOR AND DELIVERY | Age: 34
End: 2024-07-09
Payer: COMMERCIAL

## 2024-07-12 ENCOUNTER — HOSPITAL ENCOUNTER (INPATIENT)
Age: 34
LOS: 3 days | Discharge: HOME | End: 2024-07-15
Attending: OBSTETRICS & GYNECOLOGY | Admitting: OBSTETRICS & GYNECOLOGY
Payer: COMMERCIAL

## 2024-07-12 ENCOUNTER — ANESTHESIA (OUTPATIENT)
Dept: LABOR AND DELIVERY | Age: 34
End: 2024-07-12
Payer: COMMERCIAL

## 2024-07-12 PROBLEM — O99.119 GESTATIONAL THROMBOCYTOPENIA WITHOUT HEMORRHAGE (HCC): Status: ACTIVE | Noted: 2024-07-12

## 2024-07-12 PROBLEM — O34.219 PREVIOUS CESAREAN SECTION COMPLICATING PREGNANCY: Status: ACTIVE | Noted: 2024-07-12

## 2024-07-12 PROBLEM — D69.6 GESTATIONAL THROMBOCYTOPENIA WITHOUT HEMORRHAGE (HCC): Status: ACTIVE | Noted: 2024-07-12

## 2024-07-12 LAB
ABO + RH BLD: NORMAL
AMPHETAMINES UR QL SCN>1000 NG/ML: NORMAL
BARBITURATES UR QL SCN>200 NG/ML: NORMAL
BENZODIAZ UR QL SCN>200 NG/ML: NORMAL
BLD GP AB SCN SERPL QL: NORMAL
BUPRENORPHINE+NOR UR QL SCN: NORMAL
CANNABINOIDS UR QL SCN>50 NG/ML: NORMAL
COCAINE UR QL SCN: NORMAL
DEPRECATED RDW RBC AUTO: 13.8 % (ref 12.4–15.4)
DRUG SCREEN COMMENT UR-IMP: NORMAL
FENTANYL SCREEN, URINE: NORMAL
HCT VFR BLD AUTO: 35.1 % (ref 36–48)
HGB BLD-MCNC: 12.4 G/DL (ref 12–16)
MCH RBC QN AUTO: 31.6 PG (ref 26–34)
MCHC RBC AUTO-ENTMCNC: 35.3 G/DL (ref 31–36)
MCV RBC AUTO: 89.7 FL (ref 80–100)
METHADONE UR QL SCN>300 NG/ML: NORMAL
OPIATES UR QL SCN>300 NG/ML: NORMAL
OXYCODONE UR QL SCN: NORMAL
PCP UR QL SCN>25 NG/ML: NORMAL
PH UR STRIP: 7 [PH]
PLATELET # BLD AUTO: 104 K/UL (ref 135–450)
PLATELET BLD QL SMEAR: ABNORMAL
PMV BLD AUTO: 9.5 FL (ref 5–10.5)
RBC # BLD AUTO: 3.91 M/UL (ref 4–5.2)
REAGIN+T PALLIDUM IGG+IGM SERPL-IMP: NORMAL
SLIDE REVIEW: ABNORMAL
WBC # BLD AUTO: 9.1 K/UL (ref 4–11)

## 2024-07-12 PROCEDURE — 85027 COMPLETE CBC AUTOMATED: CPT

## 2024-07-12 PROCEDURE — 86850 RBC ANTIBODY SCREEN: CPT

## 2024-07-12 PROCEDURE — 2709999900 HC NON-CHARGEABLE SUPPLY: Performed by: OBSTETRICS & GYNECOLOGY

## 2024-07-12 PROCEDURE — 2580000003 HC RX 258: Performed by: OBSTETRICS & GYNECOLOGY

## 2024-07-12 PROCEDURE — 86900 BLOOD TYPING SEROLOGIC ABO: CPT

## 2024-07-12 PROCEDURE — 1220000000 HC SEMI PRIVATE OB R&B

## 2024-07-12 PROCEDURE — 2580000003 HC RX 258: Performed by: NURSE ANESTHETIST, CERTIFIED REGISTERED

## 2024-07-12 PROCEDURE — 3609079900 HC CESAREAN SECTION: Performed by: OBSTETRICS & GYNECOLOGY

## 2024-07-12 PROCEDURE — 6360000002 HC RX W HCPCS: Performed by: OBSTETRICS & GYNECOLOGY

## 2024-07-12 PROCEDURE — 86780 TREPONEMA PALLIDUM: CPT

## 2024-07-12 PROCEDURE — 59025 FETAL NON-STRESS TEST: CPT

## 2024-07-12 PROCEDURE — 86901 BLOOD TYPING SEROLOGIC RH(D): CPT

## 2024-07-12 PROCEDURE — 7100000000 HC PACU RECOVERY - FIRST 15 MIN: Performed by: OBSTETRICS & GYNECOLOGY

## 2024-07-12 PROCEDURE — 2500000003 HC RX 250 WO HCPCS: Performed by: NURSE ANESTHETIST, CERTIFIED REGISTERED

## 2024-07-12 PROCEDURE — 6360000002 HC RX W HCPCS: Performed by: NURSE ANESTHETIST, CERTIFIED REGISTERED

## 2024-07-12 PROCEDURE — 3700000000 HC ANESTHESIA ATTENDED CARE: Performed by: OBSTETRICS & GYNECOLOGY

## 2024-07-12 PROCEDURE — 2500000003 HC RX 250 WO HCPCS: Performed by: OBSTETRICS & GYNECOLOGY

## 2024-07-12 PROCEDURE — 6370000000 HC RX 637 (ALT 250 FOR IP): Performed by: OBSTETRICS & GYNECOLOGY

## 2024-07-12 PROCEDURE — 3700000001 HC ADD 15 MINUTES (ANESTHESIA): Performed by: OBSTETRICS & GYNECOLOGY

## 2024-07-12 PROCEDURE — 7100000001 HC PACU RECOVERY - ADDTL 15 MIN: Performed by: OBSTETRICS & GYNECOLOGY

## 2024-07-12 PROCEDURE — 36415 COLL VENOUS BLD VENIPUNCTURE: CPT

## 2024-07-12 PROCEDURE — 80307 DRUG TEST PRSMV CHEM ANLYZR: CPT

## 2024-07-12 RX ORDER — SODIUM CHLORIDE, SODIUM LACTATE, POTASSIUM CHLORIDE, CALCIUM CHLORIDE 600; 310; 30; 20 MG/100ML; MG/100ML; MG/100ML; MG/100ML
INJECTION, SOLUTION INTRAVENOUS CONTINUOUS
Status: DISCONTINUED | OUTPATIENT
Start: 2024-07-12 | End: 2024-07-15 | Stop reason: HOSPADM

## 2024-07-12 RX ORDER — EPHEDRINE SULFATE/0.9% NACL/PF 25 MG/5 ML
SYRINGE (ML) INTRAVENOUS PRN
Status: DISCONTINUED | OUTPATIENT
Start: 2024-07-12 | End: 2024-07-12 | Stop reason: SDUPTHER

## 2024-07-12 RX ORDER — SODIUM CHLORIDE 9 MG/ML
INJECTION, SOLUTION INTRAVENOUS PRN
Status: DISCONTINUED | OUTPATIENT
Start: 2024-07-12 | End: 2024-07-15 | Stop reason: HOSPADM

## 2024-07-12 RX ORDER — KETOROLAC TROMETHAMINE 30 MG/ML
30 INJECTION, SOLUTION INTRAMUSCULAR; INTRAVENOUS EVERY 6 HOURS
Status: COMPLETED | OUTPATIENT
Start: 2024-07-12 | End: 2024-07-13

## 2024-07-12 RX ORDER — ONDANSETRON 2 MG/ML
4 INJECTION INTRAMUSCULAR; INTRAVENOUS EVERY 6 HOURS PRN
Status: DISCONTINUED | OUTPATIENT
Start: 2024-07-12 | End: 2024-07-12 | Stop reason: SDUPTHER

## 2024-07-12 RX ORDER — KETOROLAC TROMETHAMINE 30 MG/ML
30 INJECTION, SOLUTION INTRAMUSCULAR; INTRAVENOUS EVERY 6 HOURS
Status: DISCONTINUED | OUTPATIENT
Start: 2024-07-12 | End: 2024-07-12 | Stop reason: SDUPTHER

## 2024-07-12 RX ORDER — NALOXONE HYDROCHLORIDE 0.4 MG/ML
INJECTION, SOLUTION INTRAMUSCULAR; INTRAVENOUS; SUBCUTANEOUS PRN
Status: ACTIVE | OUTPATIENT
Start: 2024-07-12 | End: 2024-07-13

## 2024-07-12 RX ORDER — ACETAMINOPHEN 500 MG
1000 TABLET ORAL EVERY 8 HOURS SCHEDULED
Status: DISCONTINUED | OUTPATIENT
Start: 2024-07-12 | End: 2024-07-15 | Stop reason: HOSPADM

## 2024-07-12 RX ORDER — MORPHINE SULFATE 0.5 MG/ML
INJECTION, SOLUTION EPIDURAL; INTRATHECAL; INTRAVENOUS PRN
Status: DISCONTINUED | OUTPATIENT
Start: 2024-07-12 | End: 2024-07-12 | Stop reason: SDUPTHER

## 2024-07-12 RX ORDER — SODIUM CHLORIDE, SODIUM LACTATE, POTASSIUM CHLORIDE, AND CALCIUM CHLORIDE .6; .31; .03; .02 G/100ML; G/100ML; G/100ML; G/100ML
1000 INJECTION, SOLUTION INTRAVENOUS ONCE
Status: COMPLETED | OUTPATIENT
Start: 2024-07-12 | End: 2024-07-12

## 2024-07-12 RX ORDER — ONDANSETRON 2 MG/ML
INJECTION INTRAMUSCULAR; INTRAVENOUS PRN
Status: DISCONTINUED | OUTPATIENT
Start: 2024-07-12 | End: 2024-07-12 | Stop reason: SDUPTHER

## 2024-07-12 RX ORDER — ACETAMINOPHEN 500 MG
1000 TABLET ORAL ONCE
Status: DISCONTINUED | OUTPATIENT
Start: 2024-07-12 | End: 2024-07-12

## 2024-07-12 RX ORDER — ONDANSETRON 4 MG/1
4 TABLET, ORALLY DISINTEGRATING ORAL EVERY 8 HOURS PRN
Status: DISCONTINUED | OUTPATIENT
Start: 2024-07-12 | End: 2024-07-15 | Stop reason: HOSPADM

## 2024-07-12 RX ORDER — IBUPROFEN 800 MG/1
800 TABLET ORAL EVERY 8 HOURS
Status: DISCONTINUED | OUTPATIENT
Start: 2024-07-13 | End: 2024-07-15 | Stop reason: HOSPADM

## 2024-07-12 RX ORDER — LANOLIN 100 %
OINTMENT (GRAM) TOPICAL
Status: DISCONTINUED | OUTPATIENT
Start: 2024-07-12 | End: 2024-07-15 | Stop reason: HOSPADM

## 2024-07-12 RX ORDER — SODIUM CHLORIDE 0.9 % (FLUSH) 0.9 %
5-40 SYRINGE (ML) INJECTION EVERY 12 HOURS SCHEDULED
Status: DISCONTINUED | OUTPATIENT
Start: 2024-07-12 | End: 2024-07-15 | Stop reason: HOSPADM

## 2024-07-12 RX ORDER — SODIUM CHLORIDE 0.9 % (FLUSH) 0.9 %
5-40 SYRINGE (ML) INJECTION EVERY 12 HOURS SCHEDULED
Status: DISCONTINUED | OUTPATIENT
Start: 2024-07-12 | End: 2024-07-12

## 2024-07-12 RX ORDER — ACETAMINOPHEN 325 MG/1
975 TABLET ORAL ONCE
Status: COMPLETED | OUTPATIENT
Start: 2024-07-12 | End: 2024-07-12

## 2024-07-12 RX ORDER — CEFAZOLIN SODIUM 1 G/3ML
INJECTION, POWDER, FOR SOLUTION INTRAMUSCULAR; INTRAVENOUS PRN
Status: DISCONTINUED | OUTPATIENT
Start: 2024-07-12 | End: 2024-07-12 | Stop reason: SDUPTHER

## 2024-07-12 RX ORDER — SODIUM CHLORIDE 9 MG/ML
INJECTION, SOLUTION INTRAVENOUS PRN
Status: DISCONTINUED | OUTPATIENT
Start: 2024-07-12 | End: 2024-07-12

## 2024-07-12 RX ORDER — FENTANYL CITRATE 50 UG/ML
INJECTION, SOLUTION INTRAMUSCULAR; INTRAVENOUS PRN
Status: DISCONTINUED | OUTPATIENT
Start: 2024-07-12 | End: 2024-07-12 | Stop reason: SDUPTHER

## 2024-07-12 RX ORDER — OXYTOCIN 10 [USP'U]/ML
INJECTION, SOLUTION INTRAMUSCULAR; INTRAVENOUS PRN
Status: DISCONTINUED | OUTPATIENT
Start: 2024-07-12 | End: 2024-07-12 | Stop reason: SDUPTHER

## 2024-07-12 RX ORDER — SODIUM CHLORIDE 0.9 % (FLUSH) 0.9 %
5-40 SYRINGE (ML) INJECTION PRN
Status: DISCONTINUED | OUTPATIENT
Start: 2024-07-12 | End: 2024-07-15 | Stop reason: HOSPADM

## 2024-07-12 RX ORDER — CITRIC ACID/SODIUM CITRATE 334-500MG
30 SOLUTION, ORAL ORAL ONCE
Status: DISCONTINUED | OUTPATIENT
Start: 2024-07-12 | End: 2024-07-12

## 2024-07-12 RX ORDER — METOCLOPRAMIDE HYDROCHLORIDE 5 MG/ML
10 INJECTION INTRAMUSCULAR; INTRAVENOUS ONCE
Status: DISCONTINUED | OUTPATIENT
Start: 2024-07-12 | End: 2024-07-12

## 2024-07-12 RX ORDER — PHENYLEPHRINE HCL IN 0.9% NACL 1 MG/10 ML
SYRINGE (ML) INTRAVENOUS PRN
Status: DISCONTINUED | OUTPATIENT
Start: 2024-07-12 | End: 2024-07-12 | Stop reason: SDUPTHER

## 2024-07-12 RX ORDER — KETOROLAC TROMETHAMINE 30 MG/ML
INJECTION, SOLUTION INTRAMUSCULAR; INTRAVENOUS PRN
Status: DISCONTINUED | OUTPATIENT
Start: 2024-07-12 | End: 2024-07-12 | Stop reason: SDUPTHER

## 2024-07-12 RX ORDER — DOCUSATE SODIUM 100 MG/1
100 CAPSULE, LIQUID FILLED ORAL 2 TIMES DAILY
Status: DISCONTINUED | OUTPATIENT
Start: 2024-07-12 | End: 2024-07-15 | Stop reason: HOSPADM

## 2024-07-12 RX ORDER — SODIUM CHLORIDE, SODIUM LACTATE, POTASSIUM CHLORIDE, CALCIUM CHLORIDE 600; 310; 30; 20 MG/100ML; MG/100ML; MG/100ML; MG/100ML
INJECTION, SOLUTION INTRAVENOUS CONTINUOUS
Status: DISCONTINUED | OUTPATIENT
Start: 2024-07-12 | End: 2024-07-12 | Stop reason: ALTCHOICE

## 2024-07-12 RX ORDER — BUPIVACAINE HYDROCHLORIDE 7.5 MG/ML
INJECTION, SOLUTION INTRASPINAL PRN
Status: DISCONTINUED | OUTPATIENT
Start: 2024-07-12 | End: 2024-07-12 | Stop reason: SDUPTHER

## 2024-07-12 RX ORDER — DIPHENHYDRAMINE HYDROCHLORIDE 50 MG/ML
25 INJECTION INTRAMUSCULAR; INTRAVENOUS EVERY 6 HOURS PRN
Status: DISCONTINUED | OUTPATIENT
Start: 2024-07-12 | End: 2024-07-15 | Stop reason: HOSPADM

## 2024-07-12 RX ORDER — SODIUM CHLORIDE 0.9 % (FLUSH) 0.9 %
10 SYRINGE (ML) INJECTION PRN
Status: DISCONTINUED | OUTPATIENT
Start: 2024-07-12 | End: 2024-07-12

## 2024-07-12 RX ORDER — ONDANSETRON 2 MG/ML
4 INJECTION INTRAMUSCULAR; INTRAVENOUS EVERY 6 HOURS PRN
Status: DISCONTINUED | OUTPATIENT
Start: 2024-07-12 | End: 2024-07-15 | Stop reason: HOSPADM

## 2024-07-12 RX ORDER — NALBUPHINE HYDROCHLORIDE 10 MG/ML
5 INJECTION, SOLUTION INTRAMUSCULAR; INTRAVENOUS; SUBCUTANEOUS EVERY 4 HOURS PRN
Status: ACTIVE | OUTPATIENT
Start: 2024-07-12 | End: 2024-07-13

## 2024-07-12 RX ORDER — SODIUM CHLORIDE, SODIUM LACTATE, POTASSIUM CHLORIDE, CALCIUM CHLORIDE 600; 310; 30; 20 MG/100ML; MG/100ML; MG/100ML; MG/100ML
INJECTION, SOLUTION INTRAVENOUS CONTINUOUS PRN
Status: DISCONTINUED | OUTPATIENT
Start: 2024-07-12 | End: 2024-07-12 | Stop reason: SDUPTHER

## 2024-07-12 RX ADMIN — SODIUM CHLORIDE, SODIUM LACTATE, POTASSIUM CHLORIDE, AND CALCIUM CHLORIDE: .6; .31; .03; .02 INJECTION, SOLUTION INTRAVENOUS at 13:32

## 2024-07-12 RX ADMIN — BUPIVACAINE HYDROCHLORIDE IN DEXTROSE 1.8 ML: 7.5 INJECTION, SOLUTION SUBARACHNOID at 13:29

## 2024-07-12 RX ADMIN — SODIUM CHLORIDE 3000 MG: 900 INJECTION INTRAVENOUS at 13:12

## 2024-07-12 RX ADMIN — FENTANYL CITRATE 10 MCG: 50 INJECTION INTRAMUSCULAR; INTRAVENOUS at 13:29

## 2024-07-12 RX ADMIN — EPHEDRINE SULFATE 5 MG: 5 INJECTION INTRAVENOUS at 13:44

## 2024-07-12 RX ADMIN — SODIUM CHLORIDE, SODIUM LACTATE, POTASSIUM CHLORIDE, AND CALCIUM CHLORIDE: .6; .31; .03; .02 INJECTION, SOLUTION INTRAVENOUS at 14:29

## 2024-07-12 RX ADMIN — DOCUSATE SODIUM 100 MG: 100 CAPSULE, LIQUID FILLED ORAL at 21:37

## 2024-07-12 RX ADMIN — Medication 100 MCG: at 13:43

## 2024-07-12 RX ADMIN — KETOROLAC TROMETHAMINE 30 MG: 30 INJECTION, SOLUTION INTRAMUSCULAR at 14:22

## 2024-07-12 RX ADMIN — Medication 87.3 MILLI-UNITS/MIN: at 14:42

## 2024-07-12 RX ADMIN — ACETAMINOPHEN 325MG 975 MG: 325 TABLET ORAL at 11:36

## 2024-07-12 RX ADMIN — KETOROLAC TROMETHAMINE 30 MG: 30 INJECTION, SOLUTION INTRAMUSCULAR at 21:37

## 2024-07-12 RX ADMIN — CEFAZOLIN SODIUM 2 G: 1 INJECTION, POWDER, FOR SOLUTION INTRAMUSCULAR; INTRAVENOUS at 13:33

## 2024-07-12 RX ADMIN — ONDANSETRON 4 MG: 2 INJECTION INTRAMUSCULAR; INTRAVENOUS at 13:59

## 2024-07-12 RX ADMIN — FAMOTIDINE 20 MG: 10 INJECTION, SOLUTION INTRAVENOUS at 11:37

## 2024-07-12 RX ADMIN — EPHEDRINE SULFATE 10 MG: 5 INJECTION INTRAVENOUS at 13:40

## 2024-07-12 RX ADMIN — MORPHINE SULFATE 0.2 MG: 0.5 INJECTION, SOLUTION EPIDURAL; INTRATHECAL; INTRAVENOUS at 13:29

## 2024-07-12 RX ADMIN — Medication 166.7 ML: at 14:42

## 2024-07-12 RX ADMIN — ONDANSETRON 4 MG: 2 INJECTION INTRAMUSCULAR; INTRAVENOUS at 13:18

## 2024-07-12 RX ADMIN — OXYTOCIN 20 UNITS: 10 INJECTION, SOLUTION INTRAMUSCULAR; INTRAVENOUS at 13:53

## 2024-07-12 RX ADMIN — SODIUM CHLORIDE, POTASSIUM CHLORIDE, SODIUM LACTATE AND CALCIUM CHLORIDE 1000 ML: 600; 310; 30; 20 INJECTION, SOLUTION INTRAVENOUS at 10:30

## 2024-07-12 RX ADMIN — EPHEDRINE SULFATE 10 MG: 5 INJECTION INTRAVENOUS at 13:35

## 2024-07-12 RX ADMIN — ACETAMINOPHEN 1000 MG: 500 TABLET ORAL at 21:37

## 2024-07-12 RX ADMIN — SODIUM CHLORIDE, SODIUM LACTATE, POTASSIUM CHLORIDE, AND CALCIUM CHLORIDE: .6; .31; .03; .02 INJECTION, SOLUTION INTRAVENOUS at 13:18

## 2024-07-12 SDOH — SOCIAL STABILITY: SOCIAL INSECURITY: WITHIN THE LAST YEAR, HAVE YOU BEEN HUMILIATED OR EMOTIONALLY ABUSED IN OTHER WAYS BY YOUR PARTNER OR EX-PARTNER?: NO

## 2024-07-12 SDOH — HEALTH STABILITY: MENTAL HEALTH
STRESS IS WHEN SOMEONE FEELS TENSE, NERVOUS, ANXIOUS, OR CAN'T SLEEP AT NIGHT BECAUSE THEIR MIND IS TROUBLED. HOW STRESSED ARE YOU?: NOT AT ALL

## 2024-07-12 SDOH — HEALTH STABILITY: MENTAL HEALTH: HOW MANY STANDARD DRINKS CONTAINING ALCOHOL DO YOU HAVE ON A TYPICAL DAY?: PATIENT DOES NOT DRINK

## 2024-07-12 SDOH — ECONOMIC STABILITY: INCOME INSECURITY: HOW HARD IS IT FOR YOU TO PAY FOR THE VERY BASICS LIKE FOOD, HOUSING, MEDICAL CARE, AND HEATING?: NOT HARD AT ALL

## 2024-07-12 SDOH — SOCIAL STABILITY: SOCIAL INSECURITY
WITHIN THE LAST YEAR, HAVE TO BEEN RAPED OR FORCED TO HAVE ANY KIND OF SEXUAL ACTIVITY BY YOUR PARTNER OR EX-PARTNER?: NO

## 2024-07-12 SDOH — SOCIAL STABILITY: SOCIAL NETWORK: ARE YOU MARRIED, WIDOWED, DIVORCED, SEPARATED, NEVER MARRIED, OR LIVING WITH A PARTNER?: MARRIED

## 2024-07-12 SDOH — HEALTH STABILITY: MENTAL HEALTH: HOW OFTEN DO YOU HAVE A DRINK CONTAINING ALCOHOL?: NEVER

## 2024-07-12 SDOH — SOCIAL STABILITY: SOCIAL NETWORK
DO YOU BELONG TO ANY CLUBS OR ORGANIZATIONS SUCH AS CHURCH GROUPS UNIONS, FRATERNAL OR ATHLETIC GROUPS, OR SCHOOL GROUPS?: NO

## 2024-07-12 SDOH — SOCIAL STABILITY: SOCIAL INSECURITY
WITHIN THE LAST YEAR, HAVE YOU BEEN KICKED, HIT, SLAPPED, OR OTHERWISE PHYSICALLY HURT BY YOUR PARTNER OR EX-PARTNER?: NO

## 2024-07-12 SDOH — SOCIAL STABILITY: SOCIAL NETWORK: HOW OFTEN DO YOU GET TOGETHER WITH FRIENDS OR RELATIVES?: THREE TIMES A WEEK

## 2024-07-12 SDOH — SOCIAL STABILITY: SOCIAL NETWORK
IN A TYPICAL WEEK, HOW MANY TIMES DO YOU TALK ON THE PHONE WITH FAMILY, FRIENDS, OR NEIGHBORS?: MORE THAN THREE TIMES A WEEK

## 2024-07-12 SDOH — SOCIAL STABILITY: SOCIAL INSECURITY: WITHIN THE LAST YEAR, HAVE YOU BEEN AFRAID OF YOUR PARTNER OR EX-PARTNER?: NO

## 2024-07-12 SDOH — HEALTH STABILITY: PHYSICAL HEALTH: ON AVERAGE, HOW MANY MINUTES DO YOU ENGAGE IN EXERCISE AT THIS LEVEL?: 0 MIN

## 2024-07-12 SDOH — SOCIAL STABILITY: SOCIAL NETWORK: HOW OFTEN DO YOU ATTEND CHURCH OR RELIGIOUS SERVICES?: 1 TO 4 TIMES PER YEAR

## 2024-07-12 SDOH — HEALTH STABILITY: PHYSICAL HEALTH: ON AVERAGE, HOW MANY DAYS PER WEEK DO YOU ENGAGE IN MODERATE TO STRENUOUS EXERCISE (LIKE A BRISK WALK)?: 0 DAYS

## 2024-07-12 SDOH — SOCIAL STABILITY: SOCIAL NETWORK: HOW OFTEN DO YOU ATTENT MEETINGS OF THE CLUB OR ORGANIZATION YOU BELONG TO?: NEVER

## 2024-07-12 ASSESSMENT — PAIN SCALES - GENERAL
PAINLEVEL_OUTOF10: 0
PAINLEVEL_OUTOF10: 2
PAINLEVEL_OUTOF10: 2

## 2024-07-12 ASSESSMENT — PAIN DESCRIPTION - LOCATION
LOCATION: ABDOMEN;INCISION
LOCATION: ABDOMEN

## 2024-07-12 ASSESSMENT — PAIN - FUNCTIONAL ASSESSMENT: PAIN_FUNCTIONAL_ASSESSMENT: ACTIVITIES ARE NOT PREVENTED

## 2024-07-12 ASSESSMENT — PATIENT HEALTH QUESTIONNAIRE - PHQ9
1. LITTLE INTEREST OR PLEASURE IN DOING THINGS: NOT AT ALL
SUM OF ALL RESPONSES TO PHQ9 QUESTIONS 1 & 2: 0
2. FEELING DOWN, DEPRESSED OR HOPELESS: NOT AT ALL

## 2024-07-12 ASSESSMENT — PAIN DESCRIPTION - DESCRIPTORS: DESCRIPTORS: BURNING;ACHING

## 2024-07-12 ASSESSMENT — PAIN DESCRIPTION - PAIN TYPE: TYPE: SURGICAL PAIN

## 2024-07-12 ASSESSMENT — PAIN DESCRIPTION - ORIENTATION: ORIENTATION: LOWER

## 2024-07-12 NOTE — FLOWSHEET NOTE
Patient ambulatory to Triage B for admission for scheduled  section.  Patient and family oriented to room.  Call light explained and provided.  EFM applied with consent to central monitor bank with alarms on.  Uterus soft and non-tender.  Admission history obtained, laboratory results reviewed and appropriate consents signed/reviewed.  Reviewed  safety and security, initial care of the  and medications given at delivery.  Questions and concerns addressed/answered.

## 2024-07-12 NOTE — FLOWSHEET NOTE
Viable female infant delivered via c/s @ 1352. Infant taken to radiant warmer for assessment per SCN GERTRUDE Pop RN.

## 2024-07-12 NOTE — FLOWSHEET NOTE
Indigo scanned/eugenio. JOSEPH Childsh CRNA at bedside and will start in OR. Pt taken by stretcher to OR2 for scheduled c/s. Spouse at bedside.

## 2024-07-12 NOTE — FLOWSHEET NOTE
Pt transferred to  room 2261. Pt oriented to room and call light. Spouse at bedside. Pt given clear liquids and crackers. Pt reports pain controlled.

## 2024-07-12 NOTE — PROGRESS NOTES
Patient admitted to room Triage B for Scheduled  section.  Patient oriented to room, call light and plan of care.  Patient given opportunity to read all appropriate consents.  RN reviewed admission process, [facility] binder and infant safety/security processes and consents were signed.  Patient verbalized understanding and questions were answered and addressed.

## 2024-07-12 NOTE — PLAN OF CARE
Problem: Pain  Goal: Verbalizes/displays adequate comfort level or baseline comfort level  Outcome: Progressing     Problem: Vaginal Birth or  Section  Goal: Fetal and maternal status remain reassuring during the birth process  Description:  Birth OB-Pregnancy care plan goal which identifies if the fetal and maternal status remain reassuring during the birth process  2024 1529 by Alicia Hunter RN  Outcome: Progressing  2024 1304 by Jim Weber RN  Outcome: Progressing     Problem: Postpartum  Goal: Experiences normal postpartum course  Description:  Postpartum OB-Pregnancy care plan goal which identifies if the mother is experiencing a normal postpartum course  Outcome: Progressing  Goal: Appropriate maternal -  bonding  Description:  Postpartum OB-Pregnancy care plan goal which identifies if the mother and  are bonding appropriately  Outcome: Progressing  Goal: Establishment of infant feeding pattern  Description:  Postpartum OB-Pregnancy care plan goal which identifies if the mother is establishing a feeding pattern with their   Outcome: Progressing  Goal: Incisions, wounds, or drain sites healing without S/S of infection  Outcome: Progressing     Problem: Infection - Adult  Goal: Absence of infection at discharge  Outcome: Progressing  Goal: Absence of infection during hospitalization  Outcome: Progressing  Goal: Absence of fever/infection during anticipated neutropenic period  Outcome: Progressing     Problem: Safety - Adult  Goal: Free from fall injury  Outcome: Progressing     Problem: Discharge Planning  Goal: Discharge to home or other facility with appropriate resources  Outcome: Progressing     Problem: Skin/Tissue Integrity - Adult  Goal: Incisions, wounds, or drain sites healing without S/S of infection  Outcome: Progressing     Problem: Genitourinary - Adult  Goal: Absence of urinary retention  Outcome: Progressing  Goal: Urinary catheter remains

## 2024-07-12 NOTE — OP NOTE
Obstetrics  Section Operative Report           Pre-operative Diagnosis:  Gestational thrombocytopenia with this and with last delivery (platelets 104K) and breech and unstable lie again on outpatient ultrasound.    Post-operative Diagnosis:  Same and unstable lie again now noted at time of delivery of fetus with baby high in the uterine cavity, transverse back up and head right with rump under the left rib and small parts down.    Procedure:  repeat low transverse  section    Surgeon:  REMI LLOYD      Assistant(s):       Anesthesia:  Spinal anesthesia    Indications: This is a 33 y.o.   OB History          3    Para   1    Term   1            AB   1    Living   1         SAB   1    IAB        Ectopic        Molar        Multiple   0    Live Births   1              with  Estimated Date of Delivery: 24 presents for indicated repeat 38 week scheduled  section with unstable lie noted outpatient ultrasound.  Today fetal heart tones are noted to migrate across the abdomen.  Ultrasound performed in pre-op holding shows baby breech, back left and head under left ribs.  Delay in schedule secondary to other cases with higher need only about an hour.  Patient ambulated to rest room with large bowel movement per her report.     TECHNIQUE:  The patient was brought to the operating room and following the placement of adequate Spinal Anesthesia, the patient was placed in dorsal supine position and wedged to the left.  A Barr catheter was placed in the patient's bladder and Darwin pumps were placed on her lower extremities for DVT prophylaxis.  Following this, the patient was prepped and draped in the usual sterile manner for a  section.  A Pfannenstiel incision was made in the skin with a knife and extended down to the fascia with sharp and blunt dissection.  The fascia was then incised in the midline with the knife.  The fascial incision was extended bilaterally using

## 2024-07-12 NOTE — L&D DELIVERY NOTE
Marie Ceballos [[id number]]      Labor Events      Cervical Ripening Date/Time:        Rupture Date/Time:  24 13:52:00   Rupture Type: Intact  Fluid Color: Clear, Bright Red (Bloody)  Fluid Odor: None  Fluid Volume: Moderate              Anesthesia    Method: Spinal       Delivery Details      Delivery Date: 24 Delivery Time: 13:52:00   Delivery Type: , Low Transverse  Trial of Labor?: No   Categorization: Repeat   Priority: scheduled       Skin Incision Type: Low Transverse  Uterine Incision: Low Transverse       Harris Presentation    Presentation: Vertex       Shoulder Dystocia    Shoulder Dystocia Present?: No       Assisted Delivery Details    Forceps Attempted?: No  Vacuum Extractor Attempted?: No                           Cord    Vessels: 3 Vessels  Complications: None  Delayed Cord Clamping?: No  Cord Clamped Date/Time: 2024 13:52:00  Cord Blood Disposition: Lab  Gases Sent?: No              Placenta    Date/Time: 2024 13:53:00  Removal: Expressed  Appearance: Intact  Disposition: Placenta Refrigerator       Lacerations           Blood Loss  Mother: Renee Ceballos #[id number]     Start of Mother's Information      Delivery Blood Loss  24 1318 - 24 1432      Quantitative Blood Loss (mL) Hospital Encounter 1620 grams    Total  1620 mL               End of Mother's Information  Mother: Renee Ceballos #[id number]                Delivery Providers    Delivering clinician: Santino Tafoya MD     Provider Role    Alicia Hunter RN Primary Nurse     Primary Harris Nurse    Iris Powers MD Neonatologist    Santino Smith, APRN - CRNA Nurse Anesthetist    Nadiya Leary OB Tech    Basilia Craig OB Tech               Assessment    Living Status: Living  Delivery Location Comment: [facility]        Skin Color:   Heart Rate:   Reflex Irritability:   Muscle Tone:   Respiratory Effort:   Total:            1 Minute:         5  Minute:                                                 [place] Measurements               Skin to Skin      Reason Skin to Skin Not Initiated: [place] Acuity                See operative report for details of  section procedure.

## 2024-07-12 NOTE — ANESTHESIA POSTPROCEDURE EVALUATION
Department of Anesthesiology  Postprocedure Note    Patient: Renee Ceballos  MRN: [id number]  YOB: 1990  Date of evaluation: 2024    Procedure Summary       Date: 24 Room / Location: [facility]D [address]    Anesthesia Start: 1318 Anesthesia Stop: 1435    Procedure:  SECTION (Abdomen) Diagnosis:       Single delivery by       (Single delivery by  [O82])    Surgeons: Santino Tafoya MD Responsible Provider: Romain Sanders MD    Anesthesia Type: spinal ASA Status: 2            Anesthesia Type: epidural    Dillon Phase I:      Dillon Phase II:      Anesthesia Post Evaluation    Patient location during evaluation: PACU  Patient participation: complete - patient participated  Level of consciousness: awake and alert  Pain score: 0  Airway patency: patent  Nausea & Vomiting: no vomiting and no nausea  Cardiovascular status: blood pressure returned to baseline and hemodynamically stable  Respiratory status: acceptable and room air  Hydration status: stable  Comments: Pt anxious, does not like feeling numb.  No pain at this time.  Pain management: adequate and satisfactory to patient        No notable events documented.

## 2024-07-12 NOTE — PROGRESS NOTES
Dr. Tafoya at bedside.  Reviewed plan of care and consents. Bedside u/s, \"breech,\" per Dr. Tafoya.

## 2024-07-12 NOTE — ANESTHESIA PRE PROCEDURE
Department of Anesthesiology  Preprocedure Note       Name:  Renee Ceballos   Age:  33 y.o.  :  1990                                          MRN:  [id number]         Date:  2024      Surgeon: Surgeon(s):  Santino Tafoya MD    Procedure: Procedure(s):   SECTION    Medications prior to admission:   Prior to Admission medications    Medication Sig Start Date End Date Taking? Authorizing Provider   Wyjovz-[id number]-DE-DHA w/o Vit A (PRENA 1 TRUE) 30-1.4 & 300 MG MISC Take by mouth    Provider, MD Tiana       Current medications:    Current Facility-Administered Medications   Medication Dose Route Frequency Provider Last Rate Last Admin    sodium chloride flush 0.9 % injection 5-40 mL  5-40 mL IntraVENous 2 times per day Santino Tafoya MD        sodium chloride flush 0.9 % injection 10 mL  10 mL IntraVENous PRN Santino Tafoya MD        0.9 % sodium chloride infusion   IntraVENous PRN Santino Tafoya MD        ceFAZolin Sodium (ANCEF) 3,000 mg in sodium chloride 0.9 % 100 mL (mini-bag)  3,000 mg IntraVENous Once Santino Tafoya MD           Allergies:  No Known Allergies    Problem List:    Patient Active Problem List   Diagnosis Code    Gestational thrombocytopenia without hemorrhage (HCC) O99.119, D69.6    Breech presentation O32.1XX0    Previous  section complicating pregnancy O34.219    Delivery of pregnancy by  section O82       Past Medical History:        Diagnosis Date     delivery delivered 2021    Thrombocytopenia (HCC)        Past Surgical History:        Procedure Laterality Date     SECTION N/A 9/3/2021     SECTION performed by Santino Tafoya MD at [facility] L&D OR     SECTION, LOW TRANSVERSE  2021       Social History:    Social History     Tobacco Use    Smoking status: Never    Smokeless tobacco: Never   Substance Use Topics    Alcohol use: Never                                Counseling given: Not

## 2024-07-12 NOTE — FLOWSHEET NOTE
07/12/24 1100   Fetal Heart Rate   Mode External US   Baseline Rate 130 bpm   Baseline Classification Normal   Variability 6-25 BPM   Pattern Accelerations   Patient Feels Fetal Movement Yes   Interventions RN at Bedside;Provider at Bedside   Uterine Activity   UA Mode [place]   Contraction Frequency irregular     NST - Dr Tafoya at bedside

## 2024-07-12 NOTE — FLOWSHEET NOTE
Recovery completed. Vitals stable. Pt reports pain tolerable. Abd soft and fundus firm u/-1. Bleeding small with no clots. Drsg dry and intact. Mother taken via bed to SCN to visit infant. Ok with SCN and infant placed skin to skin on belly with mom. Spouse at bedside.

## 2024-07-12 NOTE — ANESTHESIA PROCEDURE NOTES
Spinal Block    Patient location during procedure: OR  End time: 7/12/2024 1:42 PM  Reason for block: primary anesthetic  Staffing  Performed: resident/CRNA   Anesthesiologist: Romain Sanders MD  Resident/CRNA: Santino Smith APRN - CRNA  Performed by: Santino Smith APRN - CRNA  Authorized by: Romain Sanders MD    Spinal Block  Patient position: sitting  Prep: ChloraPrep and site prepped and draped  Patient monitoring: continuous pulse ox and frequent blood pressure checks  Approach: midline  Location: L3/L4  Provider prep: mask and sterile gloves  Local infiltration: lidocaine  Needle  Needle type: Urban   Needle gauge: 25 G  Needle length: 3.5 in  Assessment  Sensory level: T4  Swirl obtained: Yes  CSF: clear  Attempts: 1  Hemodynamics: stable  Preanesthetic Checklist  Completed: patient identified, IV checked, site marked, risks and benefits discussed, surgical/procedural consents, equipment checked, pre-op evaluation, timeout performed, anesthesia consent given, oxygen available, monitors applied/VS acknowledged, fire risk safety assessment completed and verbalized and blood product R/B/A discussed and consented

## 2024-07-12 NOTE — FLOWSHEET NOTE
Pt arrived to PACU/recovery. VSS. Bleeding small, fundus firm and sánchez draining. Drsg to abd dry and intact. Pt tearful - infant in SCN - emotional support provided. Pt denies pain at this time but reports mild itching. Cool cloth provided.

## 2024-07-12 NOTE — FLOWSHEET NOTE
Case completed. SCN brought  to OR bedside for mom to see en route to SCN for transition d/t requirement for CPAP support. Emotional support provided to parents.

## 2024-07-13 LAB
DEPRECATED RDW RBC AUTO: 13.7 % (ref 12.4–15.4)
HCT VFR BLD AUTO: 32.7 % (ref 36–48)
HGB BLD-MCNC: 11.4 G/DL (ref 12–16)
MCH RBC QN AUTO: 31.6 PG (ref 26–34)
MCHC RBC AUTO-ENTMCNC: 34.8 G/DL (ref 31–36)
MCV RBC AUTO: 90.8 FL (ref 80–100)
PLATELET # BLD AUTO: 119 K/UL (ref 135–450)
PMV BLD AUTO: 10.1 FL (ref 5–10.5)
RBC # BLD AUTO: 3.61 M/UL (ref 4–5.2)
WBC # BLD AUTO: 10.9 K/UL (ref 4–11)

## 2024-07-13 PROCEDURE — 6360000002 HC RX W HCPCS: Performed by: OBSTETRICS & GYNECOLOGY

## 2024-07-13 PROCEDURE — 1220000000 HC SEMI PRIVATE OB R&B

## 2024-07-13 PROCEDURE — 36415 COLL VENOUS BLD VENIPUNCTURE: CPT

## 2024-07-13 PROCEDURE — 6370000000 HC RX 637 (ALT 250 FOR IP): Performed by: OBSTETRICS & GYNECOLOGY

## 2024-07-13 PROCEDURE — 85027 COMPLETE CBC AUTOMATED: CPT

## 2024-07-13 RX ADMIN — ACETAMINOPHEN 1000 MG: 500 TABLET ORAL at 08:55

## 2024-07-13 RX ADMIN — DOCUSATE SODIUM 100 MG: 100 CAPSULE, LIQUID FILLED ORAL at 08:56

## 2024-07-13 RX ADMIN — ACETAMINOPHEN 1000 MG: 500 TABLET ORAL at 16:44

## 2024-07-13 RX ADMIN — KETOROLAC TROMETHAMINE 30 MG: 30 INJECTION, SOLUTION INTRAMUSCULAR at 16:44

## 2024-07-13 RX ADMIN — KETOROLAC TROMETHAMINE 30 MG: 30 INJECTION, SOLUTION INTRAMUSCULAR at 02:31

## 2024-07-13 RX ADMIN — KETOROLAC TROMETHAMINE 30 MG: 30 INJECTION, SOLUTION INTRAMUSCULAR at 08:56

## 2024-07-13 ASSESSMENT — PAIN DESCRIPTION - DESCRIPTORS
DESCRIPTORS: ACHING;CRAMPING;DISCOMFORT
DESCRIPTORS: ACHING;BURNING;SORE
DESCRIPTORS: ACHING;CRAMPING;DISCOMFORT

## 2024-07-13 ASSESSMENT — PAIN DESCRIPTION - LOCATION
LOCATION: ABDOMEN;INCISION
LOCATION: ABDOMEN
LOCATION: ABDOMEN;INCISION

## 2024-07-13 ASSESSMENT — PAIN SCALES - GENERAL
PAINLEVEL_OUTOF10: 2
PAINLEVEL_OUTOF10: 3
PAINLEVEL_OUTOF10: 2

## 2024-07-13 ASSESSMENT — PAIN DESCRIPTION - ORIENTATION
ORIENTATION: LOWER

## 2024-07-13 ASSESSMENT — PAIN DESCRIPTION - ONSET: ONSET: ON-GOING

## 2024-07-13 ASSESSMENT — PAIN - FUNCTIONAL ASSESSMENT
PAIN_FUNCTIONAL_ASSESSMENT: ACTIVITIES ARE NOT PREVENTED

## 2024-07-13 ASSESSMENT — PAIN DESCRIPTION - PAIN TYPE: TYPE: ACUTE PAIN;SURGICAL PAIN

## 2024-07-13 ASSESSMENT — PAIN DESCRIPTION - FREQUENCY: FREQUENCY: CONTINUOUS

## 2024-07-13 NOTE — PROGRESS NOTES
Obstetrics Postpartum  section - low transverse delivery Note    Subjective:     Postpartum Day 1:  section - low transverse delivery    The patient feels well. The patient denies emotional concerns. Pain is well controlled with current medications. The baby iswell. Baby is feeding via breast. Urinary output is adequate. The patient is ambulating well. The patient is tolerating a normal diet. Flatus has been passed.    Objective:      Patient Vitals for the past 8 hrs:   BP Temp Temp src Pulse Resp SpO2   24 1245 113/74 97.5 °F (36.4 °C) Oral 76 16 97 %   24 0757 98/67 97.5 °F (36.4 °C) Oral 86 -- --     General:    alert, appears stated age, and cooperative   Bowel Sounds:  active   Lochia:  appropriate   Uterine Fundus:   firm, not tender, U=-1   Incision:  healing well, no significant drainage, no dehiscence, no significant erythema   DVT Evaluation:  No evidence of DVT seen on physical exam.     Assessment:     Status post  section - low transverse delivery. Doing well postoperatively.       Plan:     Discharge home tomorrow or Monday with standard precautions and return to office in 6 weeks.

## 2024-07-13 NOTE — PLAN OF CARE
Problem: Pain  Goal: Verbalizes/displays adequate comfort level or baseline comfort level  2024 by Ameena Resendez RN  Outcome: Completed  2024 152 by Alicia Hunter RN  Outcome: Progressing     Problem: Vaginal Birth or  Section  Goal: Fetal and maternal status remain reassuring during the birth process  Description:  Birth OB-Pregnancy care plan goal which identifies if the fetal and maternal status remain reassuring during the birth process  2024 by Ameena Resendez RN  Outcome: Completed  2024 152 by Alicia Hunter, RN  Outcome: Progressing  2024 1304 by Jim Weber, RN  Outcome: Progressing

## 2024-07-13 NOTE — FLOWSHEET NOTE
07/13/24 0930   Output (mL)   Urine 600 mL   Urine Assessment   Urinary Status Voiding;Bathroom privileges   Urinary Incontinence Absent   Urine Color Kristy   Urine Appearance Clear   Urine Odor No odor     Call placed to Dr. Tafoya at 0915 to make aware of Pt unable to void. Per Dr. Tafoya RN Is to straight cath Pt at 1100 if continuing to not void or sooner if Pt begins to have pain. RN educated Pt and Pt verbalized understanding. Pt called RN shortly after to notify that she had voided.

## 2024-07-13 NOTE — FLOWSHEET NOTE
RN at bedside for shift report with YVROSE Mims. Whiteboard updated and POC discussed. Pt. Verbalized understanding. This RN to take over care at this time

## 2024-07-13 NOTE — PLAN OF CARE
Problem: Postpartum  Goal: Experiences normal postpartum course  Description:  Postpartum OB-Pregnancy care plan goal which identifies if the mother is experiencing a normal postpartum course  Outcome: Progressing  Goal: Appropriate maternal -  bonding  Description:  Postpartum OB-Pregnancy care plan goal which identifies if the mother and  are bonding appropriately  Outcome: Progressing  Goal: Establishment of infant feeding pattern  Description:  Postpartum OB-Pregnancy care plan goal which identifies if the mother is establishing a feeding pattern with their   Outcome: Progressing  Goal: Incisions, wounds, or drain sites healing without S/S of infection  Outcome: Progressing  Flowsheets  Taken 2024 0311  Incisions, Wounds, or Drain Sites Healing Without Sign and Symptoms of Infection: TWICE DAILY: Assess and document dressing/incision, wound bed, drain sites and surrounding tissue  Taken 2024 2348  Incisions, Wounds, or Drain Sites Healing Without Sign and Symptoms of Infection: TWICE DAILY: Assess and document dressing/incision, wound bed, drain sites and surrounding tissue  Taken 2024 2054  Incisions, Wounds, or Drain Sites Healing Without Sign and Symptoms of Infection: TWICE DAILY: Assess and document dressing/incision, wound bed, drain sites and surrounding tissue     Problem: Infection - Adult  Goal: Absence of infection at discharge  Outcome: Progressing  Goal: Absence of infection during hospitalization  Outcome: Progressing  Goal: Absence of fever/infection during anticipated neutropenic period  Outcome: Progressing     Problem: Safety - Adult  Goal: Free from fall injury  Outcome: Progressing     Problem: Discharge Planning  Goal: Discharge to home or other facility with appropriate resources  Outcome: Progressing     Problem: Skin/Tissue Integrity - Adult  Goal: Incisions, wounds, or drain sites healing without S/S of infection  Outcome:

## 2024-07-13 NOTE — FLOWSHEET NOTE
Assessment completed at bedside. VSS. Fundus firm at midline.  Pt with minimal bleeding.   Pt eating, drinking and sánchez draining clear yellow urine. Pain controlled at this time.  Medication side effects discussed, mom without questions at this time. Discussed plan of care with pt and FOB. Bonding well.  Call light within reach. No needs at this time.  Will monitor. Encouraged pt to call with any needs.

## 2024-07-14 PROBLEM — D69.6 GESTATIONAL THROMBOCYTOPENIA WITHOUT HEMORRHAGE (HCC): Status: RESOLVED | Noted: 2024-07-12 | Resolved: 2024-07-14

## 2024-07-14 PROBLEM — Z48.89 ENCOUNTER FOR POSTOPERATIVE CARE: Status: ACTIVE | Noted: 2024-07-14

## 2024-07-14 PROBLEM — O34.219 PREVIOUS CESAREAN SECTION COMPLICATING PREGNANCY: Status: RESOLVED | Noted: 2024-07-12 | Resolved: 2024-07-14

## 2024-07-14 PROBLEM — O99.119 GESTATIONAL THROMBOCYTOPENIA WITHOUT HEMORRHAGE (HCC): Status: RESOLVED | Noted: 2024-07-12 | Resolved: 2024-07-14

## 2024-07-14 PROCEDURE — 6370000000 HC RX 637 (ALT 250 FOR IP): Performed by: OBSTETRICS & GYNECOLOGY

## 2024-07-14 PROCEDURE — 1220000000 HC SEMI PRIVATE OB R&B

## 2024-07-14 RX ADMIN — IBUPROFEN 800 MG: 800 TABLET, FILM COATED ORAL at 23:42

## 2024-07-14 RX ADMIN — IBUPROFEN 800 MG: 800 TABLET, FILM COATED ORAL at 16:07

## 2024-07-14 RX ADMIN — ACETAMINOPHEN 1000 MG: 500 TABLET ORAL at 08:29

## 2024-07-14 RX ADMIN — IBUPROFEN 800 MG: 800 TABLET, FILM COATED ORAL at 08:29

## 2024-07-14 RX ADMIN — ACETAMINOPHEN 1000 MG: 500 TABLET ORAL at 00:00

## 2024-07-14 RX ADMIN — IBUPROFEN 800 MG: 800 TABLET, FILM COATED ORAL at 00:02

## 2024-07-14 RX ADMIN — ACETAMINOPHEN 1000 MG: 500 TABLET ORAL at 20:35

## 2024-07-14 ASSESSMENT — PAIN SCALES - GENERAL
PAINLEVEL_OUTOF10: 1
PAINLEVEL_OUTOF10: 0
PAINLEVEL_OUTOF10: 4
PAINLEVEL_OUTOF10: 2
PAINLEVEL_OUTOF10: 4
PAINLEVEL_OUTOF10: 1

## 2024-07-14 ASSESSMENT — PAIN DESCRIPTION - DESCRIPTORS
DESCRIPTORS: CRAMPING;DISCOMFORT
DESCRIPTORS: CRAMPING
DESCRIPTORS: CRAMPING;DISCOMFORT
DESCRIPTORS: DISCOMFORT
DESCRIPTORS: CRAMPING;DISCOMFORT
DESCRIPTORS: CRAMPING
DESCRIPTORS: DISCOMFORT
DESCRIPTORS: DISCOMFORT

## 2024-07-14 ASSESSMENT — PAIN DESCRIPTION - ONSET
ONSET: ON-GOING

## 2024-07-14 ASSESSMENT — PAIN DESCRIPTION - ORIENTATION
ORIENTATION: LOWER
ORIENTATION: LOWER;MID
ORIENTATION: LOWER;MID
ORIENTATION: LOWER

## 2024-07-14 ASSESSMENT — PAIN DESCRIPTION - LOCATION
LOCATION: ABDOMEN
LOCATION: ABDOMEN;INCISION
LOCATION: ABDOMEN
LOCATION: ABDOMEN;INCISION
LOCATION: ABDOMEN;INCISION
LOCATION: ABDOMEN

## 2024-07-14 ASSESSMENT — PAIN DESCRIPTION - PAIN TYPE
TYPE: ACUTE PAIN;SURGICAL PAIN

## 2024-07-14 ASSESSMENT — PAIN DESCRIPTION - FREQUENCY
FREQUENCY: CONTINUOUS

## 2024-07-14 ASSESSMENT — PAIN - FUNCTIONAL ASSESSMENT
PAIN_FUNCTIONAL_ASSESSMENT: ACTIVITIES ARE NOT PREVENTED

## 2024-07-14 NOTE — DISCHARGE INSTRUCTIONS
Thank you for the opportunity to care for you and your family.   We hope we always exceeded your expectations and provided very good care during your stay in the [facility]. We want to ensure that you have the help you need when you leave the hospital. If there is anything we can assist you with please let us know.      Please call and schedule an appointment to be seen by your obstetrician as scheduled. You will need to call and make your own appointment.    For breastfeeding moms, you can contact our lactation consultants with any problems or questions.  Please call [phone] for questions.    Diet  Eat a well balanced diet focusing on foods high in fiber and protein.  Drink plenty of fluids, especially water.  To avoid constipation you may take a mild stool softener as recommended by your doctor or midwife.     Activity  Gradually increase your activity.  Resume exercise only after advise by your doctor or midwife.  Avoid lifting anything heavier than your baby for 2 weeks.   Avoid driving for 5-7 days or when not taking narcotics.   Rise slowly from a lying to sitting and then a standing position.  Climb stairs one at a time. Use caution when carrying your baby up and down the stairs.  NO SEXUAL activity for 6 weeks or until advised by your doctor.  Nothing in the vagina.  No tampons, no douches and no intercourse.  Be prepared to discuss family planning at your follow-up OB visit.  You may feel tired or have a lack of energy.  You may continue your prenatal vitamin to replenish nutrients post delivery.  Nap when your baby naps to catch up on sleep.    EMOTIONS  You may feel nelson, sad, teary and/or overwhelmed.  Contact your OB provider if you feel you may be showing signs of postpartum depression or have thoughts of harming yourself or your baby.  If infant will not stop crying, contact another adult for help.  Place the infant in his/her crib and step away for a break.  NEVER shake your  after toileting, until your bleeding stops.  Cleanse your perineum from front to back.  If used, stitches will dissolve in 4-6 weeks.  You may use a sitz bath or soak in a clean tub with antibacterial soap as needed for comfort.  Kegel exercises will help restore bladder control.    SWELLING  Try to keep your legs elevated when you are sitting.  When lying down, keep your legs elevated.  When wearing stockings or socks, make sure they are not too tight.    WHEN TO CALL THE DOCTOR  If you have a temperature of 100.6 or more.  If your bleeding has increased and you are saturating a pad or more in 1 hour for 2 hours.  Your abdomen is tender to the touch.  You are passing blood clots bigger than the size of a lemon for several occasions.   If you are experiencing extreme weakness or dizziness.  If you are having flu-like symptoms: achy muscles or joints.  If there is a foul-smell or green color to your vaginal bleeding.  If you have pain that cannot be relieved.   You have persistent burning with urination or frequency.  Call if you have concerns about your well-being.  You are unable to sleep, eat or are having thoughts of harming yourself or your baby.  You have swelling, bleeding, drainage, foul odor, redness or warmth in/around your incision or stitches.  You have a red, warm or tender area in your calf.  Headache that rest and Tylenol does not relieve.

## 2024-07-14 NOTE — PLAN OF CARE
Problem: Postpartum  Goal: Experiences normal postpartum course  Description:  Postpartum OB-Pregnancy care plan goal which identifies if the mother is experiencing a normal postpartum course  Outcome: Adequate for Discharge  Goal: Appropriate maternal -  bonding  Description:  Postpartum OB-Pregnancy care plan goal which identifies if the mother and  are bonding appropriately  Outcome: Adequate for Discharge  Goal: Establishment of infant feeding pattern  Description:  Postpartum OB-Pregnancy care plan goal which identifies if the mother is establishing a feeding pattern with their   Outcome: Adequate for Discharge  Goal: Incisions, wounds, or drain sites healing without S/S of infection  Outcome: Adequate for Discharge     Problem: Infection - Adult  Goal: Absence of infection at discharge  Outcome: Adequate for Discharge  Goal: Absence of infection during hospitalization  Outcome: Adequate for Discharge  Goal: Absence of fever/infection during anticipated neutropenic period  Outcome: Adequate for Discharge     Problem: Safety - Adult  Goal: Free from fall injury  Outcome: Adequate for Discharge     Problem: Discharge Planning  Goal: Discharge to home or other facility with appropriate resources  Outcome: Adequate for Discharge     Problem: Skin/Tissue Integrity - Adult  Goal: Incisions, wounds, or drain sites healing without S/S of infection  Outcome: Adequate for Discharge     Problem: Genitourinary - Adult  Goal: Absence of urinary retention  Outcome: Adequate for Discharge  Goal: Urinary catheter remains patent  Outcome: Adequate for Discharge     Problem: Anxiety  Goal: Will report anxiety at manageable levels  Description: INTERVENTIONS:  1. Administer medication as ordered  2. Teach and rehearse alternative coping skills  3. Provide emotional support with 1:1 interaction with staff  Outcome: Adequate for Discharge

## 2024-07-14 NOTE — DISCHARGE SUMMARY
Obstetrical Discharge Form    Gestational Age: 38w0d    Antepartum complications: previous  section with no desire for  and with recurrence of gestational thrombocytopenia this pregnancy as with last    Date of Delivery:    Information for the patient's :  Marie Ceballos [[id number]]   @[email or web address]@    Information for the patient's :  Marie Ceballos [[id number]]   @[email or web address]@     Type of Delivery:  for scheduled repeat with low platelets    Delivered By: Santino Tafoya MD    Baby:      Information for the patient's :  Marie Ceballos [[id number]]   APGAR One: 8   Information for the patient's :  Marie Ceballos [[id number]]   APGAR Five: 8   Information for the patient's :  Marie Ceballos [[id number]]   Birth Weight: 3.39 kg (7 lb 7.6 oz)     Anesthesia: Spinal    Intrapartum complications: Intra-operative hemorrhage with control operatively (>1000 ml. QBL)    Postpartum complications: none    Discharge Medication:      Medication List        CONTINUE taking these medications      Prena 1 True 30-1.4 & 300 MG Misc               Discharge Condition:  good    Discharge Date: 7/15/2024    PLAN:  Follow up in 6 weeks for routine PP visit  All questions answered  D/C summary is written for D/C planning purposes, any delay in discharge from ordered D/C date due may be due to  factors and may be dependant on pediatric orders for  discharge planning.    Santino Tafoya MD

## 2024-07-15 ENCOUNTER — LACTATION ENCOUNTER (OUTPATIENT)
Dept: INPATIENT UNIT | Age: 34
End: 2024-07-15

## 2024-07-15 VITALS
OXYGEN SATURATION: 96 % | WEIGHT: 280 LBS | TEMPERATURE: 97.5 F | HEART RATE: 69 BPM | RESPIRATION RATE: 16 BRPM | HEIGHT: 69 IN | SYSTOLIC BLOOD PRESSURE: 124 MMHG | BODY MASS INDEX: 41.47 KG/M2 | DIASTOLIC BLOOD PRESSURE: 74 MMHG

## 2024-07-15 PROCEDURE — 6370000000 HC RX 637 (ALT 250 FOR IP): Performed by: OBSTETRICS & GYNECOLOGY

## 2024-07-15 RX ADMIN — ACETAMINOPHEN 1000 MG: 500 TABLET ORAL at 05:36

## 2024-07-15 RX ADMIN — IBUPROFEN 800 MG: 800 TABLET, FILM COATED ORAL at 09:35

## 2024-07-15 ASSESSMENT — PAIN SCALES - GENERAL
PAINLEVEL_OUTOF10: 1
PAINLEVEL_OUTOF10: 2

## 2024-07-15 ASSESSMENT — PAIN DESCRIPTION - ORIENTATION
ORIENTATION: LOWER
ORIENTATION: LOWER

## 2024-07-15 ASSESSMENT — PAIN DESCRIPTION - LOCATION: LOCATION: BACK

## 2024-07-15 ASSESSMENT — PAIN DESCRIPTION - DESCRIPTORS
DESCRIPTORS: CRAMPING
DESCRIPTORS: ACHING

## 2024-07-15 NOTE — LACTATION NOTE
This note was copied from a baby's chart.  Lactation Consult Note    Data: Consult received and appreciated. LC reviewed chart and spoke with bedside RN.    Maternal History: thrombocytopenia     OB/Delivery Risks for Lactation:  for breech     Breast pump for home use:has one    Action: LC to infant's SCN room. Introduced self and lactation services. Mother agreeable to consult at this time.  Mother  is pumping every 3 hours to provide milk for infant and using donor milk as a bride. Mother states she skips one pump session overnight to get a longer stretch of sleep. Mother  is using size 24 mm flanges and unsure if they are the correct size. Mother states she is pumping 25-30 minutes each time and her nipples are sore. Mother feels comfortable with exclusively breastfeeding for now.    Reviewed milk supply/production process. Encouraged mother to pump every 3 hours, at least 8 times per 24 hours, for 15 minutes each time, using breast massage and hand expression to maximize milk supply. Reviewed cleaning, labeling and milk storage guidelines.   Reviewed milk supply/production process and when to expect milk volumes to increase and milk to transition in. Reviewed engorgement management and comfort techniques.     Encouraged skin to skin and reviewed benefits. LC provided breastfeeding packet/handouts and encouraged mother to read for additional information about breast milk feeding, including resources for after discharge.  Mother nasrin is due to pump again at noon and agreeable to LC assist at that time. Mother nasrin has all needed supplies. Answered all questions mother asked and supported her efforts. Updated bedside RN.    Response:  Mother verbalizes understanding of information given and denies further needs at this time. LC will return as requested.     Anjali DEL REALN, RN, IBCLC  Lactation Consultant

## 2024-07-15 NOTE — PLAN OF CARE
Problem: Postpartum  Goal: Experiences normal postpartum course  Description:  Postpartum OB-Pregnancy care plan goal which identifies if the mother is experiencing a normal postpartum course  2024 by Lynsey Pappas RN  Outcome: Progressing  2024 1306 by Hailey Richards RN  Outcome: Adequate for Discharge  Goal: Appropriate maternal -  bonding  Description:  Postpartum OB-Pregnancy care plan goal which identifies if the mother and  are bonding appropriately  2024 by Lynsey Pappas RN  Outcome: Progressing  2024 1306 by Hailey Richards RN  Outcome: Adequate for Discharge  Goal: Establishment of infant feeding pattern  Description:  Postpartum OB-Pregnancy care plan goal which identifies if the mother is establishing a feeding pattern with their   2024 by Lynsey Pappas RN  Outcome: Progressing  2024 1306 by Hailey Richards RN  Outcome: Adequate for Discharge  Goal: Incisions, wounds, or drain sites healing without S/S of infection  2024 by Lynsey Pappas RN  Outcome: Progressing  2024 1306 by Hailey Richards RN  Outcome: Adequate for Discharge     Problem: Infection - Adult  Goal: Absence of infection at discharge  2024 by Lynsey Pappas RN  Outcome: Progressing  2024 1306 by Hailey Richards RN  Outcome: Adequate for Discharge  Goal: Absence of infection during hospitalization  2024 by Lynsey Pappas RN  Outcome: Progressing  2024 1306 by Hailey Richards RN  Outcome: Adequate for Discharge  Goal: Absence of fever/infection during anticipated neutropenic period  2024 by Lynsey Pappas RN  Outcome: Progressing  2024 1306 by Hailey Richards RN  Outcome: Adequate for Discharge     Problem: Safety - Adult  Goal: Free from fall injury  2024 by Lynsey Pappas RN  Outcome: Progressing  2024 1306 by Hailey Richards RN  Outcome: Adequate for Discharge     Problem: Discharge

## 2024-07-15 NOTE — PLAN OF CARE
Problem: Postpartum  Goal: Experiences normal postpartum course  Description:  Postpartum OB-Pregnancy care plan goal which identifies if the mother is experiencing a normal postpartum course  7/15/2024 1200 by Maame Lemus RN  Outcome: Completed  7/15/2024 1200 by Maame Lemus RN  Outcome: Adequate for Discharge  Goal: Appropriate maternal -  bonding  Description:  Postpartum OB-Pregnancy care plan goal which identifies if the mother and  are bonding appropriately  7/15/2024 1200 by Maame Lemus RN  Outcome: Completed  7/15/2024 1200 by Maame Lemus RN  Outcome: Adequate for Discharge  Goal: Establishment of infant feeding pattern  Description:  Postpartum OB-Pregnancy care plan goal which identifies if the mother is establishing a feeding pattern with their   7/15/2024 1200 by Maame Lemus RN  Outcome: Completed  7/15/2024 1200 by Maame Lemus RN  Outcome: Adequate for Discharge  Goal: Incisions, wounds, or drain sites healing without S/S of infection  7/15/2024 1200 by Maame Lemus RN  Outcome: Completed  7/15/2024 1200 by Maame Lemus RN  Outcome: Adequate for Discharge  Flowsheets (Taken 7/15/2024 0000 by Lynsey Pappas RN)  Incisions, Wounds, or Drain Sites Healing Without Sign and Symptoms of Infection: TWICE DAILY: Assess and document skin integrity     Problem: Infection - Adult  Goal: Absence of infection at discharge  7/15/2024 1200 by Maame Lemus RN  Outcome: Completed  7/15/2024 1200 by Maame Lemus RN  Outcome: Adequate for Discharge  Goal: Absence of infection during hospitalization  7/15/2024 1200 by Maame Lemus RN  Outcome: Completed  7/15/2024 1200 by Maame Lemus RN  Outcome: Adequate for Discharge  Goal: Absence of fever/infection during anticipated neutropenic period  7/15/2024 1200 by Maame Lemus RN  Outcome: Completed  7/15/2024 1200 by Maame Lemus RN  Outcome: Adequate for Discharge     Problem: Safety - Adult  Goal: Free from fall injury  7/15/2024 1200 by Maame Lemus  RN  Outcome: Completed  7/15/2024 1200 by Maame Lemus RN  Outcome: Adequate for Discharge     Problem: Discharge Planning  Goal: Discharge to home or other facility with appropriate resources  7/15/2024 1200 by Maame Lemus RN  Outcome: Completed  7/15/2024 1200 by Maame Lemus RN  Outcome: Adequate for Discharge     Problem: Skin/Tissue Integrity - Adult  Goal: Incisions, wounds, or drain sites healing without S/S of infection  7/15/2024 1200 by Maame Lemus RN  Outcome: Completed  7/15/2024 1200 by Maame Lemus RN  Outcome: Adequate for Discharge  Flowsheets (Taken 7/15/2024 0000 by Lynsey Pappas RN)  Incisions, Wounds, or Drain Sites Healing Without Sign and Symptoms of Infection: TWICE DAILY: Assess and document skin integrity     Problem: Genitourinary - Adult  Goal: Absence of urinary retention  7/15/2024 1200 by Maame Lemus RN  Outcome: Completed  7/15/2024 1200 by Maame Lemus RN  Outcome: Adequate for Discharge  Goal: Urinary catheter remains patent  7/15/2024 1200 by Maame Lemus RN  Outcome: Completed  7/15/2024 1200 by Maame Lemus RN  Outcome: Adequate for Discharge     Problem: Anxiety  Goal: Will report anxiety at manageable levels  Description: INTERVENTIONS:  1. Administer medication as ordered  2. Teach and rehearse alternative coping skills  3. Provide emotional support with 1:1 interaction with staff  7/15/2024 1200 by Maame Lemus RN  Outcome: Completed  7/15/2024 1200 by Maame Lemus RN  Outcome: Adequate for Discharge

## 2024-07-15 NOTE — PLAN OF CARE
Problem: Postpartum  Goal: Experiences normal postpartum course  Description:  Postpartum OB-Pregnancy care plan goal which identifies if the mother is experiencing a normal postpartum course  Outcome: Adequate for Discharge  Goal: Appropriate maternal -  bonding  Description:  Postpartum OB-Pregnancy care plan goal which identifies if the mother and  are bonding appropriately  Outcome: Adequate for Discharge  Goal: Establishment of infant feeding pattern  Description:  Postpartum OB-Pregnancy care plan goal which identifies if the mother is establishing a feeding pattern with their   Outcome: Adequate for Discharge  Goal: Incisions, wounds, or drain sites healing without S/S of infection  Outcome: Adequate for Discharge  Flowsheets (Taken 7/15/2024 0000 by Lynsey Pappas, RN)  Incisions, Wounds, or Drain Sites Healing Without Sign and Symptoms of Infection: TWICE DAILY: Assess and document skin integrity     Problem: Infection - Adult  Goal: Absence of infection at discharge  Outcome: Adequate for Discharge  Goal: Absence of infection during hospitalization  Outcome: Adequate for Discharge  Goal: Absence of fever/infection during anticipated neutropenic period  Outcome: Adequate for Discharge     Problem: Safety - Adult  Goal: Free from fall injury  Outcome: Adequate for Discharge     Problem: Discharge Planning  Goal: Discharge to home or other facility with appropriate resources  Outcome: Adequate for Discharge     Problem: Skin/Tissue Integrity - Adult  Goal: Incisions, wounds, or drain sites healing without S/S of infection  Outcome: Adequate for Discharge  Flowsheets (Taken 7/15/2024 0000 by Lynsey Pappas, RN)  Incisions, Wounds, or Drain Sites Healing Without Sign and Symptoms of Infection: TWICE DAILY: Assess and document skin integrity     Problem: Genitourinary - Adult  Goal: Absence of urinary retention  Outcome: Adequate for Discharge  Goal: Urinary catheter remains

## 2024-07-15 NOTE — LACTATION NOTE
This note was copied from a baby's chart.  Lactation Progress Note    Data: Follow up.    Action: LC to room as planned to observe pumping session. Observed mother pumping with size 24 mm flanges which are noted to be a good fit and mother states are comfortable. Encouraged hands on pumping and breast massage to maximize milk supply and fully empty breasts.  Reviewed milk supply/production process. Reviewed engorgement management and comfort techniques. 20 mL obtained after 15 minutes pumping. Ice packs provided for comfort. Answered all questions mother asked, supported her efforts and encouraged her to call as needed. Mother appreciative.    Updated bedside RN.    Response:  Mother verbalizes understanding of information given and denies further needs at this time.  Mother states will call as needed.    Anjali DEL REALN, RN, IBCLC  Lactation Consultant

## 2024-07-15 NOTE — FLOWSHEET NOTE
Postpartum care teaching completed and forms signed by patient. Copy witnessed by RN and given to patient. Patient verbalized understanding of all teaching points. Prescriptions given if applicable. Patient plans to follow-up with OB Provider as instructed. Patient verbalizes understanding of discharge instructions and denies further questions.    Patient discharged in stable condition accompanied by family/guardian. Pt continues to \"room in\" due to infant being a pt in the scn.

## 2024-07-16 ENCOUNTER — LACTATION ENCOUNTER (OUTPATIENT)
Dept: NURSERY | Age: 34
End: 2024-07-16

## 2024-07-16 NOTE — LACTATION NOTE
This note was copied from a baby's chart.  Lactation Progress Note    Data: Follow up.    Action: LC to room. Mother resting in bed. Infant sleeping in father's arms, showing no hunger cues at this time.  Infant rooming in with parents.    Mother states pumping is going well. Mother states is pumping every 3 hours, obtaining 35-45 mL per pump session, depending on time of day. Mother using size 24 mm flange which she states are comfortable and a good fit. Mother denies any nipple pain or damage.    Reviewed milk supply/production process and when to expect milk volumes to increase and milk to transition in. Reviewed engorgement management and comfort techniques as her milk is coming in. Mother states she was able to take a shower earlier and hand expressing helped relieve some engorgement. Encouraged taking motrin as prescribed and applying ice/cold packs for comfort. Ice packs noted at bedside.     LC provided breastfeeding packet/handouts and encouraged mother to read for additional information about breastfeeding, including resources for after discharge. Provided milk storage guidelines magnet.     answered all questions mother asked, supported her efforts and encouraged her to call as needed.  Parents anticipate discharge home tomorrow and state they feel prepared and ready.    Updated bedside RN.    Response:  Mother verbalizes understanding of information given and denies further needs at this time.  Mother states will call as needed.    Anjali BEACH, RN, IBCLC  Lactation Consultant

## (undated) DEVICE — SUTURE VCRL SZ 4-0 L27IN ABSRB UD L60MM KS STR REV CUT NDL J662H

## (undated) DEVICE — 3M™ STERI-STRIP™ COMPOUND BENZOIN TINCTURE 40 BAGS/CARTON 4 CARTONS/CASE C1544: Brand: 3M™ STERI-STRIP™

## (undated) DEVICE — PAD, GROUNDING, UNIVERSAL, SPLIT, 9': Brand: MEDLINE

## (undated) DEVICE — SUTURE VICRYL SZ 0 L36IN ABSRB UD L36MM CT-1 1/2 CIR J946H

## (undated) DEVICE — PAD DRESSING TELFA OUCHLESS NONADH 3X8IN

## (undated) DEVICE — WOUND RETRACTOR AND PROTECTOR: Brand: ALEXIS WOUND PROTECTOR-RETRACTOR

## (undated) DEVICE — 3M™ STERI-STRIP™ REINFORCED ADHESIVE SKIN CLOSURES, R1549, 1/2 IN X 2 IN (12 MM X 50 MM), 6 STRIPS/ENVELOPE: Brand: 3M™ STERI-STRIP™

## (undated) DEVICE — SUTURE MCRYL SZ 0 L36IN ABSRB VLT L48MM CTX 1/2 CIR Y398H

## (undated) DEVICE — TRAY URIN CATH 16FR DRNGE BG STATLOK STBL DEV F SURSTP

## (undated) DEVICE — SUTURE VCRL SZ 3-0 L27IN ABSRB UD L36MM CT-1 1/2 CIR J258H

## (undated) DEVICE — SUTURE PLN GUT SZ 3-0 L27IN ABSRB YELLOWISH TAN L36MM CT-1 842H

## (undated) DEVICE — BLADE CLIPPER GEN PURP NS

## (undated) DEVICE — TRAY SPNL L3.5IN OD25GA ANES WHTACR NDL HI FLO 0.75%

## (undated) DEVICE — APPLICATOR MEDICATED 26 CC SOLUTION HI LT ORNG CHLORAPREP

## (undated) DEVICE — SUTURE VCRL SZ 0 L36IN ABSRB UD L36MM CT-1 1/2 CIR J946H

## (undated) DEVICE — SUTURE VICRYL SZ 2-0 L36IN ABSRB UD L36MM CT-1 1/2 CIR J945H

## (undated) DEVICE — NON-ADHERENT PAD: Brand: TELFA

## (undated) DEVICE — PENCIL SMK EVAC L10FT TBNG NONSTICK ESU BLDE PLUMEPEN ELITE

## (undated) DEVICE — GLOVE SURG SZ 65 THK91MIL LTX FREE SYN POLYISOPRENE

## (undated) DEVICE — MASTISOL ADHESIVE LIQ 2/3ML

## (undated) DEVICE — PACK PROCEDURE SURG C SECT REV 1

## (undated) DEVICE — GARMENT,MEDLINE,DVT,INT,CALF,LG, GEN2: Brand: MEDLINE

## (undated) DEVICE — SUTURE MONOCRYL SZ 0 L36IN ABSRB VLT L48MM CTX 1/2 CIR Y398H

## (undated) DEVICE — GARMENT,MEDLINE,DVT,INT,CALF,MED, GEN2: Brand: MEDLINE

## (undated) DEVICE — 9165 UNIVERSAL PATIENT PLATE: Brand: 3M™

## (undated) DEVICE — 50" SINGLE PATIENT USE HOVERMATT: Brand: SINGLE PATIENT USE HOVERMATT

## (undated) DEVICE — SUTURE VICRYL SZ 4-0 L27IN ABSRB UD L60MM KS STR REV CUT NDL J662H

## (undated) DEVICE — SUTURE VICRYL SZ 3-0 L27IN ABSRB UD L36MM CT-1 1/2 CIR J258H

## (undated) DEVICE — SUTURE VCRL SZ 2-0 L36IN ABSRB UD L36MM CT-1 1/2 CIR J945H

## (undated) DEVICE — Z CONVERTED USE 2275207 CLOTH PREP W7.5XL7.5IN 2% CHG SKIN ALC AND RNS FREE

## (undated) DEVICE — WIPES SKIN CLOTH READYPREP 9 X 10.5 IN 2% CHLORHEX GLUCONATE CHG PREOP